# Patient Record
Sex: MALE | Race: WHITE | NOT HISPANIC OR LATINO | URBAN - METROPOLITAN AREA
[De-identification: names, ages, dates, MRNs, and addresses within clinical notes are randomized per-mention and may not be internally consistent; named-entity substitution may affect disease eponyms.]

---

## 2020-12-14 ENCOUNTER — EMERGENCY (EMERGENCY)
Facility: HOSPITAL | Age: 70
LOS: 1 days | Discharge: HOME | End: 2020-12-14
Attending: EMERGENCY MEDICINE | Admitting: EMERGENCY MEDICINE
Payer: MEDICARE

## 2020-12-14 VITALS
SYSTOLIC BLOOD PRESSURE: 143 MMHG | RESPIRATION RATE: 12 BRPM | HEART RATE: 62 BPM | DIASTOLIC BLOOD PRESSURE: 68 MMHG | OXYGEN SATURATION: 100 %

## 2020-12-14 VITALS
DIASTOLIC BLOOD PRESSURE: 73 MMHG | OXYGEN SATURATION: 99 % | HEIGHT: 69 IN | HEART RATE: 75 BPM | RESPIRATION RATE: 18 BRPM | WEIGHT: 199.96 LBS | SYSTOLIC BLOOD PRESSURE: 143 MMHG | TEMPERATURE: 98 F

## 2020-12-14 LAB
ALBUMIN SERPL ELPH-MCNC: 3.3 G/DL — SIGNIFICANT CHANGE UP (ref 3.3–5)
ALP SERPL-CCNC: 82 U/L — SIGNIFICANT CHANGE UP (ref 40–120)
ALT FLD-CCNC: 11 U/L — SIGNIFICANT CHANGE UP (ref 10–45)
ANION GAP SERPL CALC-SCNC: 6 MMOL/L — SIGNIFICANT CHANGE UP (ref 5–17)
AST SERPL-CCNC: 21 U/L — SIGNIFICANT CHANGE UP (ref 10–40)
BASOPHILS # BLD AUTO: 0 K/UL — SIGNIFICANT CHANGE UP (ref 0–0.2)
BASOPHILS NFR BLD AUTO: 0 % — SIGNIFICANT CHANGE UP (ref 0–2)
BILIRUB SERPL-MCNC: 0.2 MG/DL — SIGNIFICANT CHANGE UP (ref 0.2–1.2)
BUN SERPL-MCNC: 24 MG/DL — HIGH (ref 7–23)
CALCIUM SERPL-MCNC: 8.6 MG/DL — SIGNIFICANT CHANGE UP (ref 8.4–10.5)
CHLORIDE SERPL-SCNC: 101 MMOL/L — SIGNIFICANT CHANGE UP (ref 96–108)
CO2 SERPL-SCNC: 32 MMOL/L — HIGH (ref 22–31)
CREAT SERPL-MCNC: 0.98 MG/DL — SIGNIFICANT CHANGE UP (ref 0.5–1.3)
EOSINOPHIL # BLD AUTO: 0.21 K/UL — SIGNIFICANT CHANGE UP (ref 0–0.5)
EOSINOPHIL NFR BLD AUTO: 4 % — SIGNIFICANT CHANGE UP (ref 0–6)
GLUCOSE SERPL-MCNC: 86 MG/DL — SIGNIFICANT CHANGE UP (ref 70–99)
HCT VFR BLD CALC: 38.9 % — LOW (ref 39–50)
HGB BLD-MCNC: 12.9 G/DL — LOW (ref 13–17)
LYMPHOCYTES # BLD AUTO: 1.91 K/UL — SIGNIFICANT CHANGE UP (ref 1–3.3)
LYMPHOCYTES # BLD AUTO: 37 % — SIGNIFICANT CHANGE UP (ref 13–44)
MAGNESIUM SERPL-MCNC: 2.3 MG/DL — SIGNIFICANT CHANGE UP (ref 1.6–2.6)
MANUAL SMEAR VERIFICATION: SIGNIFICANT CHANGE UP
MCHC RBC-ENTMCNC: 29.4 PG — SIGNIFICANT CHANGE UP (ref 27–34)
MCHC RBC-ENTMCNC: 33.2 GM/DL — SIGNIFICANT CHANGE UP (ref 32–36)
MCV RBC AUTO: 88.6 FL — SIGNIFICANT CHANGE UP (ref 80–100)
MONOCYTES # BLD AUTO: 0.93 K/UL — HIGH (ref 0–0.9)
MONOCYTES NFR BLD AUTO: 18 % — HIGH (ref 2–14)
NEUTROPHILS # BLD AUTO: 2.11 K/UL — SIGNIFICANT CHANGE UP (ref 1.8–7.4)
NEUTROPHILS NFR BLD AUTO: 41 % — LOW (ref 43–77)
NRBC # BLD: 0 /100 — SIGNIFICANT CHANGE UP (ref 0–0)
NT-PROBNP SERPL-SCNC: 66 PG/ML — SIGNIFICANT CHANGE UP (ref 0–300)
PLAT MORPH BLD: NORMAL — SIGNIFICANT CHANGE UP
PLATELET # BLD AUTO: 198 K/UL — SIGNIFICANT CHANGE UP (ref 150–400)
POTASSIUM SERPL-MCNC: 4.4 MMOL/L — SIGNIFICANT CHANGE UP (ref 3.5–5.3)
POTASSIUM SERPL-SCNC: 4.4 MMOL/L — SIGNIFICANT CHANGE UP (ref 3.5–5.3)
PROT SERPL-MCNC: 7.3 G/DL — SIGNIFICANT CHANGE UP (ref 6–8.3)
RBC # BLD: 4.39 M/UL — SIGNIFICANT CHANGE UP (ref 4.2–5.8)
RBC # FLD: 13.8 % — SIGNIFICANT CHANGE UP (ref 10.3–14.5)
RBC BLD AUTO: NORMAL — SIGNIFICANT CHANGE UP
SODIUM SERPL-SCNC: 139 MMOL/L — SIGNIFICANT CHANGE UP (ref 135–145)
TROPONIN I SERPL-MCNC: <.017 NG/ML — LOW (ref 0.02–0.06)
WBC # BLD: 5.15 K/UL — SIGNIFICANT CHANGE UP (ref 3.8–10.5)
WBC # FLD AUTO: 5.15 K/UL — SIGNIFICANT CHANGE UP (ref 3.8–10.5)

## 2020-12-14 PROCEDURE — 83735 ASSAY OF MAGNESIUM: CPT

## 2020-12-14 PROCEDURE — 99285 EMERGENCY DEPT VISIT HI MDM: CPT

## 2020-12-14 PROCEDURE — 71250 CT THORAX DX C-: CPT

## 2020-12-14 PROCEDURE — 85025 COMPLETE CBC W/AUTO DIFF WBC: CPT

## 2020-12-14 PROCEDURE — 84484 ASSAY OF TROPONIN QUANT: CPT

## 2020-12-14 PROCEDURE — 80053 COMPREHEN METABOLIC PANEL: CPT

## 2020-12-14 PROCEDURE — 96374 THER/PROPH/DIAG INJ IV PUSH: CPT

## 2020-12-14 PROCEDURE — 71250 CT THORAX DX C-: CPT | Mod: 26

## 2020-12-14 PROCEDURE — 83880 ASSAY OF NATRIURETIC PEPTIDE: CPT

## 2020-12-14 PROCEDURE — 93005 ELECTROCARDIOGRAM TRACING: CPT

## 2020-12-14 PROCEDURE — 36415 COLL VENOUS BLD VENIPUNCTURE: CPT

## 2020-12-14 PROCEDURE — 93010 ELECTROCARDIOGRAM REPORT: CPT

## 2020-12-14 PROCEDURE — 99284 EMERGENCY DEPT VISIT MOD MDM: CPT | Mod: 25

## 2020-12-14 RX ADMIN — Medication 100 MILLIGRAM(S): at 13:45

## 2020-12-14 NOTE — ED PROVIDER NOTE - PATIENT PORTAL LINK FT
You can access the FollowMyHealth Patient Portal offered by [facility] by registering at the following website: [email or web address]. By joining [employer]’s FollowMyHealth portal, you will also be able to view your health information using other applications (apps) compatible with our system.

## 2020-12-14 NOTE — ED PROVIDER NOTE - CLINICAL SUMMARY MEDICAL DECISION MAKING FREE TEXT BOX
pt 71 yo m hx TBI, seizure disorder sent from [facility] for left sided chest pain and tenderness since he fell 3 weeks ago. also had an abrasion on his left knee but states that is not hurting him anymore  cellulitis of left knee. rib fx w/o lung pathology. will dc back to facility with IV abx

## 2020-12-14 NOTE — ED PROVIDER NOTE - ATTENDING CONTRIBUTION TO CARE
Corina with PALOMA Condon. pt 69 yo m hx TBI, seizure disorder sent from [facility] for left sided chest pain and tenderness since he fell 3 weeks ago. also had an abrasion on his left knee but states that is not hurting him anymore  cellulitis of left knee. rib fx w/o lung pathology. will dc back to facility with IV abx  I performed a face to face bedside interview with patient regarding history of present illness, review of symptoms and past medical history. I completed an independent physical exam.  I have discussed the patient's plan of care with Physician Assistant (PA). I agree with note as stated above, having amended the EMR as needed to reflect my findings.   This includes History of Present Illness, HIV, Past Medical/Surgical/Family/Social History, Allergies and Home Medications, Review of Systems, Physical Exam, and any Progress Notes during the time I functioned as the attending physician for this patient.

## 2020-12-14 NOTE — ED PROVIDER NOTE - WET READ LAUNCH FT
*  [facility]  Daily Progress Note    Admit Date:  8/21/2020      CC: ARF, CAD, HTN, CHOL  Subj: Today, -6.4L.  Continues to improve    Obj:   /67   Pulse 102   Temp 97.2 °F (36.2 °C) (Temporal)   Resp 18   Ht 5' 2\" (1.575 m)   Wt 125 lb (56.7 kg)   LMP 11/15/2017   SpO2 96%   BMI 22.86 kg/m²       Intake/Output Summary (Last 24 hours) at 8/28/2020 1134  Last data filed at 8/28/2020 0444  Gross per 24 hour   Intake 1502 ml   Output 1875 ml   Net -373 ml     Gen Alert, sob improved Heart  RRR, no MRG, nl apical impulse   Head NC, AT, no abnorm Abd  Soft, NT, +BS, no mass, no OM   Eyes PERRLA, conj/corn clear Ext  Amp   Nose Nares nl, no drain, NT Pulse Decr/absent amp   Throat Lips, mucosa, tongue nl Skin Color/text/turg nl, no rash/lesions   Neck S/S, TM, NT, no bruit/JVD Psych Nl mood and affect   Lung decr Lymph   No cervical or axillary LA   Ch wall NT, no deform Neuro  Nl gross M/S exam     CVMeds:  lipitor 40, coreg 3.125bid, brilinta 90bid, as 81, lasix 40ivbid    Lab Data: Relevant and available CV data reviewed    ASSESSMENT AND PLAN     *Acute respiratory failure  Status Acute on chronic combined s/d failure in setting of sepsis  Plan Improvement with diuresis and abx  *CAD  Hx PCI LAD, LAD stent thrombosis  TTE 40%  Plan Continue dapt with hx of stent thrombosis, bb, statin  *HTN  Status stable  Plan Continue current meds  *CHOL  Status On high dose statin  Plan Continue statin  *Hyponatremia  Status Improved today  Plan Per medicine, likely diuretic induced   Agree with changing lasix to po There are no Wet Read(s) to document.

## 2020-12-14 NOTE — ED ADULT NURSE NOTE - OBJECTIVE STATEMENT
70 yr old male BIBA A&Ox3 presents with +lt rib pain s/p fall.  Pt reports that he had a fall at the [facility] where he lives.  Pt reports that he fell on a carpet a week ago.  +pain with inspiration. States pain increases with movement.  Denies any head trauma or LOC. No bruising, deformities or open wounds  noted to Lt rib area. No SOB or work of breathing noted. No acute resp distress noted. Skin warm, dry, and intact.  Safety maintained. Will continue to monitor. 70 yr old male BIBA A&Ox3 presents with +lt rib pain s/p fall.  Pt reports that he had a fall at the [facility] where he lives.  Pt reports that he fell on a carpet a week ago and initially had LT knee pain however now he has rib pain. +pain with inspiration. States pain increases with movement.  Denies any head trauma or LOC. No bruising, deformities or open wounds  noted to Lt rib area. + redness noted to Lt knee area. No SOB or work of breathing noted. No acute resp distress noted. Skin warm, dry, and intact.  Safety maintained. Will continue to monitor.

## 2020-12-14 NOTE — ED PROVIDER NOTE - NSFOLLOWUPINSTRUCTIONS_ED_ALL_ED_FT
Cellulitis    Cellulitis is a skin infection caused by bacteria. This condition occurs most often in the arms and lower legs but can occur anywhere over the body. Symptoms include redness, swelling, warm skin, tenderness, and chills/fever. If you were prescribed an antibiotic medicine, take it as told by your health care provider. Do not stop taking the antibiotic even if you start to feel better.    SEEK IMMEDIATE MEDICAL CARE IF YOU HAVE ANY OF THE FOLLOWING SYMPTOMS: worsening fever, red streaks coming from affected area, vomiting or diarrhea, or dizziness/lightheadedness.     Follow up with your primary care provider within 48-72hours.  Take antibiotics as prescribed  Any worsening redness, swelling, streaking (red lines), fever, chills return to ER       Rib Fracture(s)    A rib fracture is a break or crack in one of the bones of the ribs. The ribs are a group of long, curved bones that wrap around your chest and attach to your spine. A broken or cracked rib is often painful, but most do not cause other problems and heal on their own over time. Symptoms include pain when you breath or cough or pain when pressing over the area. Breathing exercises will help keep your lungs inflated and prevent lung collapse or infection.    SEEK IMMEDIATE MEDICAL CARE IF YOU HAVE ANY OF THE FOLLOWING SYMPTOMS: fever, shortness of breath, coughing up blood, abdominal pain, nausea or vomiting, pain not controlled with medications.

## 2020-12-14 NOTE — ED PROVIDER NOTE - OBJECTIVE STATEMENT
pt 71 yo m hx TBI, seizure disorder sent from [facility] for left sided chest pain and tenderness since he fell 3 weeks ago. also had an abrasion on his left knee but states that is not hurting him anymore

## 2020-12-14 NOTE — ED ADULT TRIAGE NOTE - CHIEF COMPLAINT QUOTE
As per EMS fall 10-12 days ago, was evaluated and negative. Has complaints of chest pain on the left, worsening with movement.

## 2020-12-14 NOTE — ED PROVIDER NOTE - PHYSICAL EXAMINATION
General:     NAD, sleepy but arousable   Eyes: PERRL  Head:     old skull incisional scar with deformity of skull appearing chronic   Neck:     trachea midline  Lungs:     CTA b/l  Spine: no midline tenderness  Chest wall: cleft anterior chest wall tenderness. no ecchymosis, step offs or crepitus   CVS:     RRR  Abd:     +BS, s/nt/nd  Ext:   no deformities, moving all 4 extremities  Skin: erythema anterior knee with punctate vesicles on left leg.    Neuro: sleepy but arousable

## 2020-12-18 DIAGNOSIS — R07.89 OTHER CHEST PAIN: ICD-10-CM

## 2021-03-01 NOTE — ED ADULT NURSE NOTE - NSIMPLEMENTINTERV_GEN_ALL_ED
Statement Selected
Implemented All Fall Risk Interventions:  [place] to call system. Call bell, personal items and telephone within reach. Instruct patient to call for assistance. Room bathroom lighting operational. Non-slip footwear when patient is off stretcher. Physically safe environment: no spills, clutter or unnecessary equipment. Stretcher in lowest position, wheels locked, appropriate side rails in place. Provide visual cue, wrist band, yellow gown, etc. Monitor gait and stability. Monitor for mental status changes and reorient to person, place, and time. Review medications for side effects contributing to fall risk. Reinforce activity limits and safety measures with patient and family.

## 2021-05-27 ENCOUNTER — INPATIENT (INPATIENT)
Facility: HOSPITAL | Age: 71
LOS: 5 days | Discharge: NURSING, REHAB OR LONG-TERM CARE | DRG: 563 | End: 2021-06-02
Attending: HOSPITALIST | Admitting: INTERNAL MEDICINE
Payer: MEDICARE

## 2021-05-27 VITALS
SYSTOLIC BLOOD PRESSURE: 160 MMHG | TEMPERATURE: 100 F | RESPIRATION RATE: 16 BRPM | WEIGHT: 199.96 LBS | OXYGEN SATURATION: 100 % | HEIGHT: 69 IN | DIASTOLIC BLOOD PRESSURE: 74 MMHG | HEART RATE: 64 BPM

## 2021-05-27 DIAGNOSIS — S82.891A OTHER FRACTURE OF RIGHT LOWER LEG, INITIAL ENCOUNTER FOR CLOSED FRACTURE: ICD-10-CM

## 2021-05-27 DIAGNOSIS — Z98.890 OTHER SPECIFIED POSTPROCEDURAL STATES: Chronic | ICD-10-CM

## 2021-05-27 DIAGNOSIS — S82.841A DISPLACED BIMALLEOLAR FRACTURE OF RIGHT LOWER LEG, INITIAL ENCOUNTER FOR CLOSED FRACTURE: ICD-10-CM

## 2021-05-27 LAB
ALBUMIN SERPL ELPH-MCNC: 3.5 G/DL — SIGNIFICANT CHANGE UP (ref 3.3–5)
ALP SERPL-CCNC: 63 U/L — SIGNIFICANT CHANGE UP (ref 40–120)
ALT FLD-CCNC: 10 U/L — SIGNIFICANT CHANGE UP (ref 10–45)
ANION GAP SERPL CALC-SCNC: 3 MMOL/L — LOW (ref 5–17)
APTT BLD: 31.6 SEC — SIGNIFICANT CHANGE UP (ref 27.5–35.5)
AST SERPL-CCNC: 25 U/L — SIGNIFICANT CHANGE UP (ref 10–40)
BASOPHILS # BLD AUTO: 0.02 K/UL — SIGNIFICANT CHANGE UP (ref 0–0.2)
BASOPHILS NFR BLD AUTO: 0.3 % — SIGNIFICANT CHANGE UP (ref 0–2)
BILIRUB SERPL-MCNC: 0.6 MG/DL — SIGNIFICANT CHANGE UP (ref 0.2–1.2)
BLD GP AB SCN SERPL QL: SIGNIFICANT CHANGE UP
BUN SERPL-MCNC: 22 MG/DL — SIGNIFICANT CHANGE UP (ref 7–23)
CALCIUM SERPL-MCNC: 8.9 MG/DL — SIGNIFICANT CHANGE UP (ref 8.4–10.5)
CHLORIDE SERPL-SCNC: 90 MMOL/L — LOW (ref 96–108)
CO2 SERPL-SCNC: 38 MMOL/L — HIGH (ref 22–31)
CREAT SERPL-MCNC: 1.12 MG/DL — SIGNIFICANT CHANGE UP (ref 0.5–1.3)
EOSINOPHIL # BLD AUTO: 0.08 K/UL — SIGNIFICANT CHANGE UP (ref 0–0.5)
EOSINOPHIL NFR BLD AUTO: 1.2 % — SIGNIFICANT CHANGE UP (ref 0–6)
ETHANOL SERPL-MCNC: <3 MG/DL — SIGNIFICANT CHANGE UP (ref 0–3)
GLUCOSE SERPL-MCNC: 123 MG/DL — HIGH (ref 70–99)
HCT VFR BLD CALC: 35.9 % — LOW (ref 39–50)
HGB BLD-MCNC: 12 G/DL — LOW (ref 13–17)
IMM GRANULOCYTES NFR BLD AUTO: 0.6 % — SIGNIFICANT CHANGE UP (ref 0–1.5)
INR BLD: 0.97 RATIO — SIGNIFICANT CHANGE UP (ref 0.88–1.16)
LYMPHOCYTES # BLD AUTO: 1.1 K/UL — SIGNIFICANT CHANGE UP (ref 1–3.3)
LYMPHOCYTES # BLD AUTO: 16.7 % — SIGNIFICANT CHANGE UP (ref 13–44)
MCHC RBC-ENTMCNC: 28.4 PG — SIGNIFICANT CHANGE UP (ref 27–34)
MCHC RBC-ENTMCNC: 33.4 GM/DL — SIGNIFICANT CHANGE UP (ref 32–36)
MCV RBC AUTO: 85.1 FL — SIGNIFICANT CHANGE UP (ref 80–100)
MONOCYTES # BLD AUTO: 1.26 K/UL — HIGH (ref 0–0.9)
MONOCYTES NFR BLD AUTO: 19.1 % — HIGH (ref 2–14)
NEUTROPHILS # BLD AUTO: 4.09 K/UL — SIGNIFICANT CHANGE UP (ref 1.8–7.4)
NEUTROPHILS NFR BLD AUTO: 62.1 % — SIGNIFICANT CHANGE UP (ref 43–77)
NRBC # BLD: 0 /100 WBCS — SIGNIFICANT CHANGE UP (ref 0–0)
PLATELET # BLD AUTO: 202 K/UL — SIGNIFICANT CHANGE UP (ref 150–400)
POTASSIUM SERPL-MCNC: 3 MMOL/L — LOW (ref 3.5–5.3)
POTASSIUM SERPL-SCNC: 3 MMOL/L — LOW (ref 3.5–5.3)
PROT SERPL-MCNC: 7.6 G/DL — SIGNIFICANT CHANGE UP (ref 6–8.3)
PROTHROM AB SERPL-ACNC: 11.8 SEC — SIGNIFICANT CHANGE UP (ref 10.6–13.6)
RBC # BLD: 4.22 M/UL — SIGNIFICANT CHANGE UP (ref 4.2–5.8)
RBC # FLD: 13.2 % — SIGNIFICANT CHANGE UP (ref 10.3–14.5)
SODIUM SERPL-SCNC: 131 MMOL/L — LOW (ref 135–145)
TROPONIN I SERPL-MCNC: <.017 NG/ML — LOW (ref 0.02–0.06)
WBC # BLD: 6.59 K/UL — SIGNIFICANT CHANGE UP (ref 3.8–10.5)
WBC # FLD AUTO: 6.59 K/UL — SIGNIFICANT CHANGE UP (ref 3.8–10.5)

## 2021-05-27 PROCEDURE — 93010 ELECTROCARDIOGRAM REPORT: CPT

## 2021-05-27 PROCEDURE — 73700 CT LOWER EXTREMITY W/O DYE: CPT | Mod: 26,RT,MA

## 2021-05-27 PROCEDURE — 29515 APPLICATION SHORT LEG SPLINT: CPT

## 2021-05-27 PROCEDURE — 76376 3D RENDER W/INTRP POSTPROCES: CPT | Mod: 26

## 2021-05-27 PROCEDURE — 99285 EMERGENCY DEPT VISIT HI MDM: CPT | Mod: 25

## 2021-05-27 PROCEDURE — 73610 X-RAY EXAM OF ANKLE: CPT | Mod: 26,RT

## 2021-05-27 PROCEDURE — 99221 1ST HOSP IP/OBS SF/LOW 40: CPT

## 2021-05-27 PROCEDURE — 99223 1ST HOSP IP/OBS HIGH 75: CPT

## 2021-05-27 PROCEDURE — 70450 CT HEAD/BRAIN W/O DYE: CPT | Mod: 26,MA

## 2021-05-27 RX ORDER — TRAMADOL HYDROCHLORIDE 50 MG/1
50 TABLET ORAL EVERY 6 HOURS
Refills: 0 | Status: DISCONTINUED | OUTPATIENT
Start: 2021-05-27 | End: 2021-06-02

## 2021-05-27 RX ORDER — LEVOTHYROXINE SODIUM 125 MCG
25 TABLET ORAL DAILY
Refills: 0 | Status: DISCONTINUED | OUTPATIENT
Start: 2021-05-27 | End: 2021-06-02

## 2021-05-27 RX ORDER — ACETAMINOPHEN 500 MG
650 TABLET ORAL EVERY 8 HOURS
Refills: 0 | Status: DISCONTINUED | OUTPATIENT
Start: 2021-05-27 | End: 2021-06-02

## 2021-05-27 RX ORDER — BACLOFEN 100 %
10 POWDER (GRAM) MISCELLANEOUS AT BEDTIME
Refills: 0 | Status: DISCONTINUED | OUTPATIENT
Start: 2021-05-27 | End: 2021-06-02

## 2021-05-27 RX ORDER — SENNA PLUS 8.6 MG/1
2 TABLET ORAL AT BEDTIME
Refills: 0 | Status: DISCONTINUED | OUTPATIENT
Start: 2021-05-27 | End: 2021-06-02

## 2021-05-27 RX ORDER — ESCITALOPRAM OXALATE 10 MG/1
20 TABLET, FILM COATED ORAL DAILY
Refills: 0 | Status: DISCONTINUED | OUTPATIENT
Start: 2021-05-27 | End: 2021-06-02

## 2021-05-27 RX ORDER — MEMANTINE HYDROCHLORIDE 10 MG/1
10 TABLET ORAL AT BEDTIME
Refills: 0 | Status: DISCONTINUED | OUTPATIENT
Start: 2021-05-27 | End: 2021-06-02

## 2021-05-27 RX ORDER — THIAMINE MONONITRATE (VIT B1) 100 MG
100 TABLET ORAL DAILY
Refills: 0 | Status: DISCONTINUED | OUTPATIENT
Start: 2021-05-27 | End: 2021-06-02

## 2021-05-27 RX ORDER — MIRTAZAPINE 45 MG/1
7.5 TABLET, ORALLY DISINTEGRATING ORAL AT BEDTIME
Refills: 0 | Status: DISCONTINUED | OUTPATIENT
Start: 2021-05-27 | End: 2021-06-02

## 2021-05-27 RX ORDER — DIVALPROEX SODIUM 500 MG/1
500 TABLET, DELAYED RELEASE ORAL THREE TIMES A DAY
Refills: 0 | Status: DISCONTINUED | OUTPATIENT
Start: 2021-05-27 | End: 2021-05-28

## 2021-05-27 RX ORDER — ACETAMINOPHEN 500 MG
650 TABLET ORAL EVERY 12 HOURS
Refills: 0 | Status: DISCONTINUED | OUTPATIENT
Start: 2021-05-27 | End: 2021-06-02

## 2021-05-27 RX ORDER — OXYCODONE HYDROCHLORIDE 5 MG/1
5 TABLET ORAL EVERY 6 HOURS
Refills: 0 | Status: DISCONTINUED | OUTPATIENT
Start: 2021-05-27 | End: 2021-06-02

## 2021-05-27 RX ORDER — POTASSIUM CHLORIDE 20 MEQ
40 PACKET (EA) ORAL ONCE
Refills: 0 | Status: COMPLETED | OUTPATIENT
Start: 2021-05-27 | End: 2021-05-27

## 2021-05-27 RX ORDER — LIDOCAINE 4 G/100G
1 CREAM TOPICAL DAILY
Refills: 0 | Status: DISCONTINUED | OUTPATIENT
Start: 2021-05-27 | End: 2021-06-02

## 2021-05-27 RX ORDER — ALPRAZOLAM 0.25 MG
0.25 TABLET ORAL
Refills: 0 | Status: DISCONTINUED | OUTPATIENT
Start: 2021-05-27 | End: 2021-05-28

## 2021-05-27 RX ORDER — CARBIDOPA AND LEVODOPA 25; 100 MG/1; MG/1
1 TABLET ORAL
Refills: 0 | Status: DISCONTINUED | OUTPATIENT
Start: 2021-05-27 | End: 2021-06-02

## 2021-05-27 RX ORDER — CHOLECALCIFEROL (VITAMIN D3) 125 MCG
1000 CAPSULE ORAL DAILY
Refills: 0 | Status: DISCONTINUED | OUTPATIENT
Start: 2021-05-27 | End: 2021-06-02

## 2021-05-27 RX ORDER — FLUTICASONE PROPIONATE 50 MCG
1 SPRAY, SUSPENSION NASAL
Refills: 0 | Status: DISCONTINUED | OUTPATIENT
Start: 2021-05-27 | End: 2021-06-02

## 2021-05-27 RX ORDER — LACOSAMIDE 50 MG/1
200 TABLET ORAL
Refills: 0 | Status: DISCONTINUED | OUTPATIENT
Start: 2021-05-27 | End: 2021-06-02

## 2021-05-27 RX ORDER — ENOXAPARIN SODIUM 100 MG/ML
40 INJECTION SUBCUTANEOUS DAILY
Refills: 0 | Status: DISCONTINUED | OUTPATIENT
Start: 2021-05-27 | End: 2021-06-02

## 2021-05-27 RX ORDER — FOLIC ACID 0.8 MG
1 TABLET ORAL DAILY
Refills: 0 | Status: DISCONTINUED | OUTPATIENT
Start: 2021-05-27 | End: 2021-06-02

## 2021-05-27 RX ORDER — PANTOPRAZOLE SODIUM 20 MG/1
40 TABLET, DELAYED RELEASE ORAL
Refills: 0 | Status: DISCONTINUED | OUTPATIENT
Start: 2021-05-27 | End: 2021-06-02

## 2021-05-27 RX ORDER — QUETIAPINE FUMARATE 200 MG/1
25 TABLET, FILM COATED ORAL AT BEDTIME
Refills: 0 | Status: DISCONTINUED | OUTPATIENT
Start: 2021-05-27 | End: 2021-06-02

## 2021-05-27 RX ORDER — LACOSAMIDE 50 MG/1
300 TABLET ORAL
Refills: 0 | Status: DISCONTINUED | OUTPATIENT
Start: 2021-05-27 | End: 2021-06-02

## 2021-05-27 RX ORDER — FUROSEMIDE 40 MG
40 TABLET ORAL DAILY
Refills: 0 | Status: DISCONTINUED | OUTPATIENT
Start: 2021-05-27 | End: 2021-06-02

## 2021-05-27 RX ORDER — SODIUM CHLORIDE 9 MG/ML
1000 INJECTION INTRAMUSCULAR; INTRAVENOUS; SUBCUTANEOUS
Refills: 0 | Status: DISCONTINUED | OUTPATIENT
Start: 2021-05-27 | End: 2021-05-28

## 2021-05-27 RX ORDER — ACETAMINOPHEN 500 MG
650 TABLET ORAL ONCE
Refills: 0 | Status: COMPLETED | OUTPATIENT
Start: 2021-05-27 | End: 2021-05-27

## 2021-05-27 RX ORDER — ASPIRIN/CALCIUM CARB/MAGNESIUM 324 MG
81 TABLET ORAL DAILY
Refills: 0 | Status: DISCONTINUED | OUTPATIENT
Start: 2021-05-27 | End: 2021-06-02

## 2021-05-27 RX ORDER — RISPERIDONE 4 MG/1
0.25 TABLET ORAL
Refills: 0 | Status: DISCONTINUED | OUTPATIENT
Start: 2021-05-27 | End: 2021-06-02

## 2021-05-27 RX ORDER — PRIMIDONE 250 MG/1
50 TABLET ORAL
Refills: 0 | Status: DISCONTINUED | OUTPATIENT
Start: 2021-05-27 | End: 2021-05-28

## 2021-05-27 RX ORDER — PRIMIDONE 250 MG/1
50 TABLET ORAL DAILY
Refills: 0 | Status: DISCONTINUED | OUTPATIENT
Start: 2021-05-27 | End: 2021-06-02

## 2021-05-27 RX ORDER — GABAPENTIN 400 MG/1
300 CAPSULE ORAL AT BEDTIME
Refills: 0 | Status: DISCONTINUED | OUTPATIENT
Start: 2021-05-27 | End: 2021-06-02

## 2021-05-27 RX ORDER — POTASSIUM CHLORIDE 20 MEQ
20 PACKET (EA) ORAL ONCE
Refills: 0 | Status: COMPLETED | OUTPATIENT
Start: 2021-05-27 | End: 2021-05-27

## 2021-05-27 RX ADMIN — CARBIDOPA AND LEVODOPA 1 TABLET(S): 25; 100 TABLET ORAL at 21:49

## 2021-05-27 RX ADMIN — Medication 0.25 MILLIGRAM(S): at 23:41

## 2021-05-27 RX ADMIN — SENNA PLUS 2 TABLET(S): 8.6 TABLET ORAL at 21:52

## 2021-05-27 RX ADMIN — MEMANTINE HYDROCHLORIDE 10 MILLIGRAM(S): 10 TABLET ORAL at 21:49

## 2021-05-27 RX ADMIN — MIRTAZAPINE 7.5 MILLIGRAM(S): 45 TABLET, ORALLY DISINTEGRATING ORAL at 21:50

## 2021-05-27 RX ADMIN — Medication 40 MILLIEQUIVALENT(S): at 16:16

## 2021-05-27 RX ADMIN — Medication 650 MILLIGRAM(S): at 14:25

## 2021-05-27 RX ADMIN — DIVALPROEX SODIUM 500 MILLIGRAM(S): 500 TABLET, DELAYED RELEASE ORAL at 21:50

## 2021-05-27 RX ADMIN — QUETIAPINE FUMARATE 25 MILLIGRAM(S): 200 TABLET, FILM COATED ORAL at 21:49

## 2021-05-27 RX ADMIN — Medication 20 MILLIEQUIVALENT(S): at 18:33

## 2021-05-27 RX ADMIN — GABAPENTIN 300 MILLIGRAM(S): 400 CAPSULE ORAL at 21:50

## 2021-05-27 RX ADMIN — Medication 650 MILLIGRAM(S): at 13:25

## 2021-05-27 RX ADMIN — SODIUM CHLORIDE 75 MILLILITER(S): 9 INJECTION INTRAMUSCULAR; INTRAVENOUS; SUBCUTANEOUS at 20:26

## 2021-05-27 RX ADMIN — Medication 10 MILLIGRAM(S): at 21:50

## 2021-05-27 NOTE — CONSULT NOTE ADULT - PROBLEM SELECTOR RECOMMENDATION 9
I discussed treatment options with the patient. As the fracture appears to be stable and non-displaced I have advised conservative management with splint followed by cast immobilization.  He needs to remain strictly NWB on the right leg.  We discussed that if the fracture displaces he will require surgery.  He will need f/u x-rays weekly for the first 4 weeks.  Ice, elevation  DVT prophylaxis.  F/u in office after discharge.

## 2021-05-27 NOTE — ED ADULT NURSE NOTE - CHIEF COMPLAINT QUOTE
BIBA S/P fall with R ankle injury.  As per pt. he also had unwitnessed fall down last night, denies head injury.  ISAR positive.

## 2021-05-27 NOTE — ED ADULT NURSE NOTE - OBJECTIVE STATEMENT
Pt presents to ED from the [facility] s/p fall. Pt states he felt bilateral leg weakness and fell, now c/o right ankle pain and swelling. Denies any head trauma.

## 2021-05-27 NOTE — H&P ADULT - HISTORY OF PRESENT ILLNESS
70 y/o M with PMH of HTN, CAD, TBI, seizure disorder, dementia, Parkinson's, psychosis was BIB EMS from the [facility] for right ankle pain and swelling s/p fall last night. Pt reports his legs felt weak, gave up on him and he fell. He denies head injury or LOC. Denies dizziness, CP, SOB, palpitations prior or after the all. Pt denies any fever, chills, cough, abd pain, n/v/d, dysuria.   Spoke to pt's power of , Scarlet, she is concerned that pt fell due to alcohol as she saw many travel sized bottles of alcohol in pt's room. Pt states that the last drink he had was 1 month ago. alcohol level <3 in ED.  In ED, afebrile, VS stable. CT right ankle showed right distal tibia and fibular fracture. s/p splint in ED.

## 2021-05-27 NOTE — ED PROVIDER NOTE - OBJECTIVE STATEMENT
70 y/o M  with PMH of TBI, seizure, HTN was BIB EMS from the [facility] for right ankle pain and swelling s/p fall yesterday. Pt reports his legs felt weak and he fell. He denies head injury, CP, SOB, dizziness, symptoms prior to fall or other injuries 70 y/o M  with PMH of TBI, seizure, HTN, Parkinson's was BIB EMS from the [facility] for right ankle pain and swelling s/p fall yesterday. Pt reports his legs felt weak and he fell. He denies head injury, CP, SOB, dizziness, symptoms prior to fall or other injuries

## 2021-05-27 NOTE — H&P ADULT - NSICDXFAMILYHX_GEN_ALL_CORE_FT
FAMILY HISTORY:  Father  Still living? Unknown  Family history of rectal cancer, Age at diagnosis: Age Unknown

## 2021-05-27 NOTE — ED PROVIDER NOTE - PHYSICAL EXAMINATION
msk: + right ankle swelling with TTP over the lateral ankle. 2+pedal pulses. no bruising.   no neurovasc compromise   pelvis stable   FROM of hips and shoulders b/l

## 2021-05-27 NOTE — H&P ADULT - NSHPPHYSICALEXAM_GEN_ALL_CORE
T(C): 36.5 (05-27-21 @ 18:00), Max: 37.5 (05-27-21 @ 12:44)  HR: 60 (05-27-21 @ 18:00) (60 - 64)  BP: 134/81 (05-27-21 @ 18:00) (134/81 - 160/74)  RR: 16 (05-27-21 @ 18:00) (16 - 16)  SpO2: 100% (05-27-21 @ 18:00) (100% - 100%)  Wt(kg): --Vital Signs Last 24 Hrs  T(C): 36.5 (27 May 2021 18:00), Max: 37.5 (27 May 2021 12:44)  T(F): 97.7 (27 May 2021 18:00), Max: 99.5 (27 May 2021 12:44)  HR: 60 (27 May 2021 18:00) (60 - 64)  BP: 134/81 (27 May 2021 18:00) (134/81 - 160/74)  BP(mean): --  RR: 16 (27 May 2021 18:00) (16 - 16)  SpO2: 100% (27 May 2021 18:00) (100% - 100%)    PHYSICAL EXAM:  GENERAL: NAD, answering questions appropriately  HEAD:  Atraumatic, Normocephalic  EYES: EOMI, PERRLA, conjunctiva and sclera clear  ENMT: No tonsillar erythema, exudates, or enlargement; Moist mucous membranes, Good dentition, No lesions  NECK: Supple, No JVD  NERVOUS SYSTEM: awake, alert Good concentration; moving all extremities except RLE due to pain  CHEST/LUNG: Clear to percussion bilaterally; No rales, rhonchi, wheezing, or rubs  HEART: Regular rate and rhythm; No murmurs, rubs, or gallops  ABDOMEN: Soft, Nontender, Nondistended; Bowel sounds present  EXTREMITIES:  No clubbing, cyanosis, or edema. RLE in splint

## 2021-05-27 NOTE — H&P ADULT - NSICDXPASTMEDICALHX_GEN_ALL_CORE_FT
PAST MEDICAL HISTORY:  Dementia     CARMEN (generalized anxiety disorder)     HTN (hypertension)     Parkinsons disease     Seizure     TBI (traumatic brain injury)

## 2021-05-27 NOTE — ED ADULT NURSE REASSESSMENT NOTE - NS ED NURSE REASSESS COMMENT FT1
Corinne (power of ) called to let the team know that the patient has been drinking alcohol lately. She is not sure how much but when speaking to the patient yesterday, he seemed to be intoxicated. PALOMA Lan made aware.

## 2021-05-27 NOTE — ED ADULT TRIAGE NOTE - CHIEF COMPLAINT QUOTE
BIBA S/P fall with R ankle injury.  As per pt. he also had unwitnessed fall down last night, denies head injury. BIBA S/P fall with R ankle injury.  As per pt. he also had unwitnessed fall down last night, denies head injury.  ISAR positive.

## 2021-05-27 NOTE — ED PROVIDER NOTE - ATTENDING CONTRIBUTION TO CARE
72 y/o M  with PMH of TBI, seizure, HTN, Parkinson's was BIB EMS from the [facility] for right ankle pain and swelling s/p fall yesterday. Pt reports his legs felt weak and he fell. He denies head injury, CP, SOB, dizziness, symptoms prior to fall or other injuries. PE as noted above. Labs reviewed, k-3.0 will replenish. ankle XR images reviewed-- ?fx of lateral malleolus, will get an ankle CT.    337pm- KRISTI Reyes spoke with patients power of  Corinne, she states pt has been drinking ETOH at the [facility], he was intoxicated last night which caused his fall. will add on ETOH level and check head CT    5pm- ankle CT results reviewed: Intra-articular coronally oriented fracture of the distal tibia and fibula. Ortho paged, awaiting call back. posterior splint placed, will admit    512pm: Spoke with Dr. Soares, he agrees with admission plan. He also reccds adding stirup splint.  Dr. Palma:  I have reviewed and discussed with the PA/ resident the case specifics, including the history, physical assessment, evaluation, conclusion, laboratory results, and medical plan. I agree with the contents, and conclusions. I have personally examined, and interviewed the patient.

## 2021-05-27 NOTE — CONSULT NOTE ADULT - SUBJECTIVE AND OBJECTIVE BOX
YAHIR QUINTANA      71y Male with PMH of HTN, CAD, TBI, seizure disorder, dementia, Parkinson's, psychosis was BIB EMS from the [facility] for right ankle pain and swelling s/p fall last night. Pt reports his legs felt weak, gave up on him and he fell. He denies head injury or LOC. Denies dizziness, CP, SOB, palpitations prior or after the all.                                                                                                                                                         PAST MEDICAL HISTORY:  Dementia     CARMEN (generalized anxiety disorder)     HTN (hypertension)     Parkinsons disease     Seizure     TBI (traumatic brain injury).     PAST SURGICAL HISTORY:  Status post craniectomy.       T(F): 97.9  HR: 57  BP: 162/89  RR: 17  SpO2: 99%                        12.0   6.59  )-----------( 202      ( 27 May 2021 13:20 )             35.9                     05-27    131<L>  |  90<L>  |  22  ----------------------------<  123<H>  3.0<L>   |  38<H>  |  1.12    Ca    8.9      27 May 2021 13:20    TPro  7.6  /  Alb  3.5  /  TBili  0.6  /  DBili  x   /  AST  25  /  ALT  10  /  AlkPhos  63  05-27      Physical Exam :    -   Right leg with splint in place, C/D/I, diffuse swelling and tenderness about the ankle and foot.   -   Distal Neurvascular status intact grossly.   -   Warm well perfused; capillary refill <3 seconds   -   (+)EHL/FHL 5/5  -   (+) Sensation to light touch      EXAM:  XR ANKLE COMP MIN 3 VIEWS RT      PROCEDURE DATE:  05/27/2021        INTERPRETATION:  Right ankle. Patient fell with local trauma. 3 views.    There is fairly significant ankle edema.    There is mild degeneration of the malleolar tips. There are small posterior and inferior calcaneal spurs. No bone destruction or fracture.    It should be noted the CAT scan which followed showed coronally oriented fractures of the distal tibia and fibula.    IMPRESSION: Fractures seen on CAT scan. Extensive edema.              MARIAM WERNER M.D., ATTENDING RADIOLOGIST  This document has been electronically signed. May 27 2021  4:59PM        EXAM:  CT ANKLE ONLY RT    EXAM:  CT 3D RECONSTRUCT WO ZACARIASYOSVANYTRINITY      PROCEDURE DATE:  05/27/2021        INTERPRETATION:  CT ANKLE RIGHT, CT 3D RECONSTRUCTION WO WORKSTATION dated 5/27/2021 3:57 PM    INDICATION: Fall with ankle pain.    COMPARISON: Ankle radiographs dated 5/27/2021    TECHNIQUE: CT imaging of the right ankle through forefoot was performed. The data was reformatted in the axial, coronal, and sagittal planes. Additionally, 3-D reformatted imaging was created.    FINDINGS:    OSSEOUS STRUCTURES: There is a coronally oriented fracture line traversing the distal tibia and fibula in similar fashion with intra-articular extension. There is a small fracture gap at the articular surface measuring up to 0.3 cm at both the tibia and fibula. No additional fracture is appreciated.  SYNOVIUM/ JOINT FLUID: No joint effusion  TENDONS: Tendons are intact. No full-thickness tendon tear or retraction  MUSCLES: Preserved musculature.  NEUROVASCULAR STRUCTURES: Severe vascular calcifications.  SUBCUTANEOUS SOFT TISSUES: Large circumferential ankle soft tissue swelling.    3-D reformatted imaging confirms these findings.    IMPRESSION:    Intra-articular coronally oriented fracture of the distal tibia and fibula              SURJIT HYMAN MD; Attending Radiologist  This document has been electronically signed. May 27 2021  4:27PM

## 2021-05-27 NOTE — ED ADULT NURSE NOTE - PMH
Dementia    CARMEN (generalized anxiety disorder)    HTN (hypertension)    Parkinsons disease    Seizure    TBI (traumatic brain injury)

## 2021-05-27 NOTE — ED PROVIDER NOTE - CLINICAL SUMMARY MEDICAL DECISION MAKING FREE TEXT BOX
70 y/o M  with PMH of TBI, seizure, HTN, Parkinson's was BIB EMS from the [facility] for right ankle pain and swelling s/p fall yesterday. Pt reports his legs felt weak and he fell. He denies head injury, CP, SOB, dizziness, symptoms prior to fall or other injuries. PE as noted above. Labs reviewed, k-3.0 will replenish. ankle XR images reviewed-- ?fx of lateral malleolus, will get an ankle CT.    337pm- KRISTI Reyes spoke with patients power of  Corinne, she states pt has been drinking ETOH at the [facility], he was intoxicated last night which caused his fall. will add on ETOH level and check head CT 72 y/o M  with PMH of TBI, seizure, HTN, Parkinson's was BIB EMS from the [facility] for right ankle pain and swelling s/p fall yesterday. Pt reports his legs felt weak and he fell. He denies head injury, CP, SOB, dizziness, symptoms prior to fall or other injuries. PE as noted above. Labs reviewed, k-3.0 will replenish. ankle XR images reviewed-- ?fx of lateral malleolus, will get an ankle CT.    337pm- KRISTI Reyes spoke with patients power of  Corinne, she states pt has been drinking ETOH at the [facility], he was intoxicated last night which caused his fall. will add on ETOH level and check head CT    5pm- ankle CT results reviewed: Intra-articular coronally oriented fracture of the distal tibia and fibula. Ortho paged, awaiting call back. posterior splint placed, will admit 72 y/o M  with PMH of TBI, seizure, HTN, Parkinson's was BIB EMS from the [facility] for right ankle pain and swelling s/p fall yesterday. Pt reports his legs felt weak and he fell. He denies head injury, CP, SOB, dizziness, symptoms prior to fall or other injuries. PE as noted above. Labs reviewed, k-3.0 will replenish. ankle XR images reviewed-- ?fx of lateral malleolus, will get an ankle CT.    337pm- KRISTI Reyes spoke with patients power of  Corinne, she states pt has been drinking ETOH at the [facility], he was intoxicated last night which caused his fall. will add on ETOH level and check head CT    5pm- ankle CT results reviewed: Intra-articular coronally oriented fracture of the distal tibia and fibula. Ortho paged, awaiting call back. posterior splint placed, will admit    512pm: Spoke with Dr. Soares, he agrees with admission plan. He also reccds adding stirup splint.

## 2021-05-27 NOTE — H&P ADULT - ASSESSMENT
70 y/o M with PMH of HTN, CAD, CHF, TBI, seizure disorder, dementia, Parkinson's, psychosis was BIB EMS from the [facility] for right ankle pain and swelling s/p fall, found to have right distal tibia and fibular fracture.    # Acute right distal tibia and fibular fracture.  # r/o fall due to polypharmacy   - s/p splint in ED  - pain control  - pending orthopedic consult, cardiology consult for pre-op if surgery needed  - pt on multiple sedative medications, monitor pt closely, consider psych consult to adjust meds    # Alcohol use?  pt reports his last drink was one month ago  pt's power yoselin hollis is concerned that pt fell due to alcohol as she saw many travel sized bottles of alcohol in pt's room.  alcohol level<3   will order for symptom triggered CIWA protocol   folate, thiamine and multivitamin     # Hyponatremia   - encourage adequate po intake  - monitor    # Hypokalemia  - repleted. monitor    # CAD   # CHF  - on lasix 40mg BID and metolazone 2.5mg mon, wed, fri  - check ECHO, no past echo in record  - not on ASA or BB?    # psychosis/ anxiety  - cont risperdal, seroquel, mirtazepine and xanax    # chronic back pain  - tylenol prn,   - baclofen  - lidocaine     # history of TBI and seizure disorder  - vimpat  - primidone     # Parkinsons and dementia  - sinemet     # dvt ppx: lovenox subq    IMPROVE VTE Individual Risk Assessment    RISK                                                                Points    [  ] Previous VTE                                                  3    [  ] Thrombophilia                                               2    [  ] Lower limb paralysis                                      2        (unable to hold up >15 seconds)      [  ] Current Cancer                                              2         (within 6 months)    [  ] Immobilization > 24 hrs                                1    [  ] ICU/CCU stay > 24 hours                              1    [  ] Age > 60                                                      1    IMPROVE VTE Score _________    IMPROVE Score 0-1: Low Risk, No VTE prophylaxis required for most patients, encourage ambulation.   IMPROVE Score 2-3: At risk, pharmacologic VTE prophylaxis is indicated for most patients (in the absence of a contraindication)  IMPROVE Score > or = 4: High Risk, pharmacologic VTE prophylaxis is indicated for most patients (in the absence of a contraindication)     72 y/o M with PMH of HTN, CAD, CHF, TBI, seizure disorder, dementia, Parkinson's, psychosis was BIB EMS from the [facility] for right ankle pain and swelling s/p fall, found to have right distal tibia and fibular fracture.    # Acute right distal tibia and fibular fracture.  # r/o fall due to polypharmacy   - s/p splint in ED  - pain control  - pending orthopedic consult, cardiology consult for pre-op if surgery needed  - pt on multiple sedative medications, monitor pt closely, consider psych consult to adjust meds    # Alcohol use?  pt reports his last drink was one month ago  pt's power of bunny is concerned that pt fell due to alcohol as she saw many travel sized bottles of alcohol in pt's room.  alcohol level<3   will order for symptom triggered CIWA protocol   folate, thiamine and multivitamin     # Hyponatremia   - encourage adequate po intake  - monitor    # Hypokalemia  - repleted. monitor    # CAD   # CHF  - on lasix 40mg BID and metolazone 2.5mg mon, wed, fri  - check ECHO, no past echo in record  - not on ASA or BB?    # psychosis/ anxiety  - cont risperdal, seroquel, mirtazepine and xanax    # chronic back pain  - tylenol prn,   - baclofen  - lidocaine     # history of TBI and seizure disorder  - vimpat  - primidone     # Parkinsons and dementia  - sinemet     # dvt ppx: lovenox subq    Updated pt's power Scarlet campoverde, [phone] regarding plan of care and pt's status    IMPROVE VTE Individual Risk Assessment    RISK                                                                Points    [  ] Previous VTE                                                  3    [  ] Thrombophilia                                               2    [  ] Lower limb paralysis                                      2        (unable to hold up >15 seconds)      [  ] Current Cancer                                              2         (within 6 months)    [  ] Immobilization > 24 hrs                                1    [  ] ICU/CCU stay > 24 hours                              1    [  ] Age > 60                                                      1    IMPROVE VTE Score _________    IMPROVE Score 0-1: Low Risk, No VTE prophylaxis required for most patients, encourage ambulation.   IMPROVE Score 2-3: At risk, pharmacologic VTE prophylaxis is indicated for most patients (in the absence of a contraindication)  IMPROVE Score > or = 4: High Risk, pharmacologic VTE prophylaxis is indicated for most patients (in the absence of a contraindication)     72 y/o M with PMH of HTN, CAD, CHF, TBI, seizure disorder, dementia, Parkinson's, psychosis was BIB EMS from the [facility] for right ankle pain and swelling s/p fall, found to have right distal tibia and fibular fracture.    # Acute right distal tibia and fibular fracture.  # r/o fall due to polypharmacy   - s/p splint in ED  - pain control  - pending orthopedic consult, cardiology consult for pre-op if surgery needed  - pt on multiple sedative medications, monitor pt closely, consider psych consult to adjust meds    # Alcohol use?  pt reports his last drink was one month ago  pt's power of bunny is concerned that pt fell due to alcohol as she saw many travel sized bottles of alcohol in pt's room.  alcohol level<3   will order for symptom triggered CIWA protocol   folate, thiamine and multivitamin     # Hyponatremia   - IVF for 12 h  - encourage adequate po intake  - monitor    # Hypokalemia  - repleted. monitor    # CAD   # CHF  - on lasix 40mg BID and metolazone 2.5mg mon, wed, fri  - check ECHO, no past echo in record  - not on ASA or BB?    # psychosis/ anxiety  - cont risperdal, seroquel, mirtazepine and xanax    # chronic back pain  - tylenol prn,   - baclofen  - lidocaine     # history of TBI and seizure disorder  - vimpat  - primidone     # Parkinsons and dementia  - sinemet     # dvt ppx: lovenox subq    Updated pt's power of Scarlet hollis, [phone] regarding plan of care and pt's status    IMPROVE VTE Individual Risk Assessment    RISK                                                                Points    [  ] Previous VTE                                                  3    [  ] Thrombophilia                                               2    [  ] Lower limb paralysis                                      2        (unable to hold up >15 seconds)      [  ] Current Cancer                                              2         (within 6 months)    [  ] Immobilization > 24 hrs                                1    [  ] ICU/CCU stay > 24 hours                              1    [  ] Age > 60                                                      1    IMPROVE VTE Score _________    IMPROVE Score 0-1: Low Risk, No VTE prophylaxis required for most patients, encourage ambulation.   IMPROVE Score 2-3: At risk, pharmacologic VTE prophylaxis is indicated for most patients (in the absence of a contraindication)  IMPROVE Score > or = 4: High Risk, pharmacologic VTE prophylaxis is indicated for most patients (in the absence of a contraindication)     70 y/o M with PMH of HTN, CAD, CHF, TBI, seizure disorder, dementia, Parkinson's, psychosis was BIB EMS from the [facility] for right ankle pain and swelling s/p fall, found to have right distal tibia and fibular fracture.    # Acute right distal tibia and fibular fracture.  # r/o fall due to other causes: polypharmacy, dehydration  - s/p splint in ED  - pain control  - pending orthopedic consult, cardiology consult for pre-op if surgery needed  - pt on multiple sedative medications, monitor pt closely, consider psych consult to adjust meds if needed  - check valproic acid level    # Alcohol dependence/ abuse?  pt reports his last drink was one month ago  pt's power of bunny is concerned that pt fell due to alcohol as she saw many travel sized alcohol bottles in pt's room.  alcohol level<3 in ED  will order for symptom triggered CIWA protocol   folate, thiamine and multivitamin     # Hyponatremia   - IVF for 12 h  - encourage adequate po intake  - monitor    # Hypokalemia  - repleted. monitor    # CAD   # CHF  - on lasix 40mg BID and metolazone 2.5mg mon, wed, fri  - not on ASA or BB?  - add ASA  - check ECHO, no past echo in record    # psychosis/ anxiety  - cont risperdal, seroquel, mirtazepine and xanax    # chronic back pain  - tylenol prn,   - baclofen  - lidocaine     # history of TBI and seizure disorder  - vimpat  - primidone     # Parkinsons and dementia  - sinemet     # dvt ppx: lovenox subq    Updated pt's power of Scarlet hollis, [phone] regarding plan of care and pt's status    IMPROVE VTE Individual Risk Assessment    RISK                                                                Points    [  ] Previous VTE                                                  3    [  ] Thrombophilia                                               2    [  ] Lower limb paralysis                                      2        (unable to hold up >15 seconds)      [  ] Current Cancer                                              2         (within 6 months)    [  ] Immobilization > 24 hrs                                1    [  ] ICU/CCU stay > 24 hours                              1    [  ] Age > 60                                                      1    IMPROVE VTE Score _________    IMPROVE Score 0-1: Low Risk, No VTE prophylaxis required for most patients, encourage ambulation.   IMPROVE Score 2-3: At risk, pharmacologic VTE prophylaxis is indicated for most patients (in the absence of a contraindication)  IMPROVE Score > or = 4: High Risk, pharmacologic VTE prophylaxis is indicated for most patients (in the absence of a contraindication)     70 y/o M with PMH of HTN, CAD, CHF, TBI, seizure disorder, dementia, Parkinson's, psychosis was BIB EMS from the [facility] for right ankle pain and swelling s/p fall, found to have right distal tibia and fibular fracture.    # Acute right distal tibia and fibular fracture.  # r/o fall due to other causes: polypharmacy, dehydration  - s/p splint in ED  - pain control  - pending orthopedic consult, cardiology consult for pre-op if surgery needed  - pt on multiple sedative medications, monitor pt closely, consider psych consult to adjust meds if needed  - check valproic acid level  - decrease lasix to once a day for now    # Alcohol dependence/ abuse?  pt reports his last drink was one month ago  pt's power of bunny is concerned that pt fell due to alcohol as she saw many travel sized alcohol bottles in pt's room.  alcohol level<3 in ED  will order for symptom triggered CIWA protocol   folate, thiamine and multivitamin     # Hyponatremia   - IVF for 12 h  - encourage adequate po intake  - monitor    # Hypokalemia  - repleted. monitor    # CAD   # CHF  - on lasix 40mg BID and metolazone 2.5mg mon, wed, fri at home  - decrease lasix to 40mg daily  - not on ASA or BB?  - add ASA  - check ECHO, no past echo in record    # psychosis/ anxiety  - cont lexapro, risperdal, seroquel, mirtazepine and xanax    # chronic back pain  - tylenol prn,   - baclofen  - lidocaine     # history of TBI and seizure disorder  - vimpat  - depakote  - primidone     # Parkinsons and dementia  - sinemet     # dvt ppx: lovenox subq    Updated pt's power of Scarlet hollis, [phone] regarding plan of care and pt's status    IMPROVE VTE Individual Risk Assessment    RISK                                                                Points    [  ] Previous VTE                                                  3    [  ] Thrombophilia                                               2    [  ] Lower limb paralysis                                      2        (unable to hold up >15 seconds)      [  ] Current Cancer                                              2         (within 6 months)    [  ] Immobilization > 24 hrs                                1    [  ] ICU/CCU stay > 24 hours                              1    [  ] Age > 60                                                      1    IMPROVE VTE Score _________    IMPROVE Score 0-1: Low Risk, No VTE prophylaxis required for most patients, encourage ambulation.   IMPROVE Score 2-3: At risk, pharmacologic VTE prophylaxis is indicated for most patients (in the absence of a contraindication)  IMPROVE Score > or = 4: High Risk, pharmacologic VTE prophylaxis is indicated for most patients (in the absence of a contraindication)     70 y/o M with PMH of HTN, CAD, CHF, TBI, seizure disorder, dementia, Parkinson's, psychosis was BIB EMS from the [facility] for right ankle pain and swelling s/p fall, found to have right distal tibia and fibular fracture.    # Acute right distal tibia and fibular fracture.  # r/o fall due to other causes: polypharmacy, dehydration  - s/p splint in ED  - pain control  - pending orthopedic consult, cardiology consult for pre-op if surgery needed  - pt on multiple sedative medications, monitor pt closely, consider psych consult to adjust meds if needed  - check valproic acid level  - decrease lasix to once a day for now    # Alcohol dependence/ abuse?  pt reports his last drink was one month ago  pt's power of bunny is concerned that pt fell due to alcohol as she saw many travel sized alcohol bottles in pt's room.  alcohol level<3 in ED  will order for symptom triggered CIWA protocol   folate, thiamine and multivitamin     # Hyponatremia   - IVF for 12 h  - encourage adequate po intake  - monitor    # Hypokalemia  - repleted. monitor    # CAD   # CHF  - on lasix 40mg BID and metolazone 2.5mg mon, wed, fri at home  - serum bicarb 38; decrease lasix to 40mg daily  - not on ASA or BB?  - will add ASA  - check ECHO, no past echo in record    # psychosis/ anxiety  - cont lexapro, risperdal, seroquel, mirtazepine and xanax    # chronic back pain  - tylenol prn,   - baclofen  - lidocaine     # history of TBI and seizure disorder  - vimpat  - depakote  - primidone     # Parkinsons and dementia  - sinemet     # dvt ppx: lovenox subq    Updated pt's power of Scarlet hollis, [phone] regarding plan of care and pt's status    IMPROVE VTE Individual Risk Assessment    RISK                                                                Points    [  ] Previous VTE                                                  3    [  ] Thrombophilia                                               2    [  ] Lower limb paralysis                                      2        (unable to hold up >15 seconds)      [  ] Current Cancer                                              2         (within 6 months)    [  ] Immobilization > 24 hrs                                1    [  ] ICU/CCU stay > 24 hours                              1    [  ] Age > 60                                                      1    IMPROVE VTE Score _________    IMPROVE Score 0-1: Low Risk, No VTE prophylaxis required for most patients, encourage ambulation.   IMPROVE Score 2-3: At risk, pharmacologic VTE prophylaxis is indicated for most patients (in the absence of a contraindication)  IMPROVE Score > or = 4: High Risk, pharmacologic VTE prophylaxis is indicated for most patients (in the absence of a contraindication)

## 2021-05-27 NOTE — H&P ADULT - NSHPLABSRESULTS_GEN_ALL_CORE
LABS:                        12.0   6.59  )-----------( 202      ( 27 May 2021 13:20 )             35.9     05-27    131<L>  |  90<L>  |  22  ----------------------------<  123<H>  3.0<L>   |  38<H>  |  1.12    Ca    8.9      27 May 2021 13:20    TPro  7.6  /  Alb  3.5  /  TBili  0.6  /  DBili  x   /  AST  25  /  ALT  10  /  AlkPhos  63  05-27         CAPILLARY BLOOD GLUCOSE        RADIOLOGY & ADDITIONAL TESTS:    EKG reviewed by me. NSR    Consultant(s) Notes Reviewed:  [x ] YES  [ ] NO  Care Discussed with Consultants/Other Providers [ x] YES  [ ] NO Dr. aPlma  Imaging Personally Reviewed:  [ ] YES  [ ] NO

## 2021-05-27 NOTE — H&P ADULT - NSHPSOCIALHISTORY_GEN_ALL_CORE
ex smoker, quit 25 years ago  pt stated last drink one month ago, used to drink about 1 bottle of wine per week   denies illicit drug use

## 2021-05-28 LAB
ANION GAP SERPL CALC-SCNC: 5 MMOL/L — SIGNIFICANT CHANGE UP (ref 5–17)
BUN SERPL-MCNC: 18 MG/DL — SIGNIFICANT CHANGE UP (ref 7–23)
CALCIUM SERPL-MCNC: 8.3 MG/DL — LOW (ref 8.4–10.5)
CHLORIDE SERPL-SCNC: 91 MMOL/L — LOW (ref 96–108)
CO2 SERPL-SCNC: 35 MMOL/L — HIGH (ref 22–31)
COVID-19 SPIKE DOMAIN AB INTERP: POSITIVE
COVID-19 SPIKE DOMAIN ANTIBODY RESULT: >250 U/ML — HIGH
CREAT SERPL-MCNC: 0.86 MG/DL — SIGNIFICANT CHANGE UP (ref 0.5–1.3)
GLUCOSE SERPL-MCNC: 98 MG/DL — SIGNIFICANT CHANGE UP (ref 70–99)
HCV AB S/CO SERPL IA: 17.76 S/CO — HIGH (ref 0–0.99)
HCV AB SERPL-IMP: REACTIVE
MAGNESIUM SERPL-MCNC: 2.1 MG/DL — SIGNIFICANT CHANGE UP (ref 1.6–2.6)
PHOSPHATE SERPL-MCNC: 3 MG/DL — SIGNIFICANT CHANGE UP (ref 2.5–4.5)
POTASSIUM SERPL-MCNC: 3.2 MMOL/L — LOW (ref 3.5–5.3)
POTASSIUM SERPL-SCNC: 3.2 MMOL/L — LOW (ref 3.5–5.3)
SARS-COV-2 IGG+IGM SERPL QL IA: >250 U/ML — HIGH
SARS-COV-2 IGG+IGM SERPL QL IA: POSITIVE
SARS-COV-2 RNA SPEC QL NAA+PROBE: SIGNIFICANT CHANGE UP
SODIUM SERPL-SCNC: 131 MMOL/L — LOW (ref 135–145)
VALPROATE SERPL-MCNC: 74 UG/ML — SIGNIFICANT CHANGE UP (ref 50–100)

## 2021-05-28 PROCEDURE — 99233 SBSQ HOSP IP/OBS HIGH 50: CPT

## 2021-05-28 PROCEDURE — 93306 TTE W/DOPPLER COMPLETE: CPT | Mod: 26

## 2021-05-28 RX ORDER — SODIUM CHLORIDE 9 MG/ML
1000 INJECTION INTRAMUSCULAR; INTRAVENOUS; SUBCUTANEOUS
Refills: 0 | Status: DISCONTINUED | OUTPATIENT
Start: 2021-05-28 | End: 2021-05-30

## 2021-05-28 RX ORDER — DIVALPROEX SODIUM 500 MG/1
500 TABLET, DELAYED RELEASE ORAL THREE TIMES A DAY
Refills: 0 | Status: DISCONTINUED | OUTPATIENT
Start: 2021-05-28 | End: 2021-06-02

## 2021-05-28 RX ORDER — POTASSIUM CHLORIDE 20 MEQ
40 PACKET (EA) ORAL EVERY 4 HOURS
Refills: 0 | Status: COMPLETED | OUTPATIENT
Start: 2021-05-28 | End: 2021-05-28

## 2021-05-28 RX ORDER — ALPRAZOLAM 0.25 MG
0.25 TABLET ORAL THREE TIMES A DAY
Refills: 0 | Status: DISCONTINUED | OUTPATIENT
Start: 2021-05-28 | End: 2021-05-28

## 2021-05-28 RX ORDER — PRIMIDONE 250 MG/1
100 TABLET ORAL
Refills: 0 | Status: DISCONTINUED | OUTPATIENT
Start: 2021-05-28 | End: 2021-06-02

## 2021-05-28 RX ORDER — ALPRAZOLAM 0.25 MG
0.25 TABLET ORAL THREE TIMES A DAY
Refills: 0 | Status: DISCONTINUED | OUTPATIENT
Start: 2021-05-28 | End: 2021-06-01

## 2021-05-28 RX ADMIN — RISPERIDONE 0.25 MILLIGRAM(S): 4 TABLET ORAL at 05:09

## 2021-05-28 RX ADMIN — LACOSAMIDE 300 MILLIGRAM(S): 50 TABLET ORAL at 17:57

## 2021-05-28 RX ADMIN — PANTOPRAZOLE SODIUM 40 MILLIGRAM(S): 20 TABLET, DELAYED RELEASE ORAL at 05:11

## 2021-05-28 RX ADMIN — Medication 1 SPRAY(S): at 05:08

## 2021-05-28 RX ADMIN — Medication 100 MILLIGRAM(S): at 12:03

## 2021-05-28 RX ADMIN — Medication 40 MILLIEQUIVALENT(S): at 12:03

## 2021-05-28 RX ADMIN — SODIUM CHLORIDE 75 MILLILITER(S): 9 INJECTION INTRAMUSCULAR; INTRAVENOUS; SUBCUTANEOUS at 12:06

## 2021-05-28 RX ADMIN — GABAPENTIN 300 MILLIGRAM(S): 400 CAPSULE ORAL at 21:51

## 2021-05-28 RX ADMIN — Medication 650 MILLIGRAM(S): at 05:09

## 2021-05-28 RX ADMIN — Medication 650 MILLIGRAM(S): at 17:57

## 2021-05-28 RX ADMIN — Medication 0.25 MILLIGRAM(S): at 21:52

## 2021-05-28 RX ADMIN — LIDOCAINE 1 PATCH: 4 CREAM TOPICAL at 12:06

## 2021-05-28 RX ADMIN — Medication 25 MICROGRAM(S): at 05:08

## 2021-05-28 RX ADMIN — TRAMADOL HYDROCHLORIDE 50 MILLIGRAM(S): 50 TABLET ORAL at 21:55

## 2021-05-28 RX ADMIN — Medication 40 MILLIGRAM(S): at 05:10

## 2021-05-28 RX ADMIN — PRIMIDONE 100 MILLIGRAM(S): 250 TABLET ORAL at 17:57

## 2021-05-28 RX ADMIN — Medication 1 MILLIGRAM(S): at 12:03

## 2021-05-28 RX ADMIN — SENNA PLUS 2 TABLET(S): 8.6 TABLET ORAL at 21:48

## 2021-05-28 RX ADMIN — Medication 10 MILLIGRAM(S): at 21:45

## 2021-05-28 RX ADMIN — CARBIDOPA AND LEVODOPA 1 TABLET(S): 25; 100 TABLET ORAL at 05:09

## 2021-05-28 RX ADMIN — DIVALPROEX SODIUM 500 MILLIGRAM(S): 500 TABLET, DELAYED RELEASE ORAL at 14:05

## 2021-05-28 RX ADMIN — SODIUM CHLORIDE 75 MILLILITER(S): 9 INJECTION INTRAMUSCULAR; INTRAVENOUS; SUBCUTANEOUS at 20:10

## 2021-05-28 RX ADMIN — MEMANTINE HYDROCHLORIDE 10 MILLIGRAM(S): 10 TABLET ORAL at 21:45

## 2021-05-28 RX ADMIN — CARBIDOPA AND LEVODOPA 1 TABLET(S): 25; 100 TABLET ORAL at 17:57

## 2021-05-28 RX ADMIN — SODIUM CHLORIDE 75 MILLILITER(S): 9 INJECTION INTRAMUSCULAR; INTRAVENOUS; SUBCUTANEOUS at 09:52

## 2021-05-28 RX ADMIN — Medication 1 TABLET(S): at 12:04

## 2021-05-28 RX ADMIN — CARBIDOPA AND LEVODOPA 1 TABLET(S): 25; 100 TABLET ORAL at 12:04

## 2021-05-28 RX ADMIN — LACOSAMIDE 200 MILLIGRAM(S): 50 TABLET ORAL at 08:28

## 2021-05-28 RX ADMIN — Medication 40 MILLIEQUIVALENT(S): at 13:56

## 2021-05-28 RX ADMIN — MIRTAZAPINE 7.5 MILLIGRAM(S): 45 TABLET, ORALLY DISINTEGRATING ORAL at 21:45

## 2021-05-28 RX ADMIN — LIDOCAINE 1 PATCH: 4 CREAM TOPICAL at 18:37

## 2021-05-28 RX ADMIN — DIVALPROEX SODIUM 500 MILLIGRAM(S): 500 TABLET, DELAYED RELEASE ORAL at 21:48

## 2021-05-28 RX ADMIN — ENOXAPARIN SODIUM 40 MILLIGRAM(S): 100 INJECTION SUBCUTANEOUS at 12:03

## 2021-05-28 RX ADMIN — Medication 650 MILLIGRAM(S): at 18:28

## 2021-05-28 RX ADMIN — Medication 81 MILLIGRAM(S): at 12:04

## 2021-05-28 RX ADMIN — Medication 0.25 MILLIGRAM(S): at 12:03

## 2021-05-28 RX ADMIN — TRAMADOL HYDROCHLORIDE 50 MILLIGRAM(S): 50 TABLET ORAL at 22:45

## 2021-05-28 RX ADMIN — Medication 1000 UNIT(S): at 12:03

## 2021-05-28 RX ADMIN — RISPERIDONE 0.25 MILLIGRAM(S): 4 TABLET ORAL at 17:57

## 2021-05-28 RX ADMIN — CARBIDOPA AND LEVODOPA 1 TABLET(S): 25; 100 TABLET ORAL at 20:10

## 2021-05-28 RX ADMIN — Medication 0.25 MILLIGRAM(S): at 05:08

## 2021-05-28 RX ADMIN — QUETIAPINE FUMARATE 25 MILLIGRAM(S): 200 TABLET, FILM COATED ORAL at 21:44

## 2021-05-28 RX ADMIN — PRIMIDONE 50 MILLIGRAM(S): 250 TABLET ORAL at 05:09

## 2021-05-28 RX ADMIN — ESCITALOPRAM OXALATE 20 MILLIGRAM(S): 10 TABLET, FILM COATED ORAL at 12:03

## 2021-05-28 RX ADMIN — Medication 650 MILLIGRAM(S): at 06:09

## 2021-05-28 RX ADMIN — LIDOCAINE 1 PATCH: 4 CREAM TOPICAL at 18:28

## 2021-05-28 RX ADMIN — DIVALPROEX SODIUM 500 MILLIGRAM(S): 500 TABLET, DELAYED RELEASE ORAL at 05:10

## 2021-05-28 NOTE — PHYSICAL THERAPY INITIAL EVALUATION ADULT - ADDITIONAL COMMENTS
patient poor historian, confused; reports lives with four young children; reports independent without device PTA

## 2021-05-28 NOTE — PHARMACOTHERAPY INTERVENTION NOTE - COMMENTS
Completed med rec based on information provided by SN[facility] paperwork. Discrepancies relayed to MD.  Vimpat dose adjusted to outpatient regimen (200mg at 9am, 300mg at 5pm).  Primidone dose adjusted to outpatient regimen (50mg, 2 tabs twice a day).

## 2021-05-28 NOTE — PHYSICAL THERAPY INITIAL EVALUATION ADULT - PATIENT/FAMILY/SIGNIFICANT OTHER GOALS STATEMENT, PT EVAL
s/p "leg giving out", right bimalleolar fx. per CT and Xray ankle; CT head revealed no mass effect or hemorrhage return to prior health

## 2021-05-28 NOTE — PROGRESS NOTE ADULT - ASSESSMENT
71M with PMHx of HTN, CAD, CHF, TBI, seizure disorder, dementia, Parkinson's, psychosis was BIB EMS from the [facility] for right ankle pain and swelling s/p fall, found to have right distal tibia and fibular fracture.    #Acute right distal tibia and fibular fracture, suspect large component of polypharmacy (on multiple sedative medications including benzos) and/or dehydration  - right ankle now immobilized  - pain control  - ortho following, non-surgical at this time  - weekly x-rays x 4 weeks, elevation    #Possible alcohol abuse  Questionable if related to fall - patient stated last drink > 1 month ago  Patient's POA (Scarlet) noted many travel sized alcohol bottles in pt's room, but alcohol level < 3 in ED  CIWA scores minimal  folate, thiamine and multivitamin     #Hyponatremia   -IVF  -Repeat BMP in AM    #Hypokalemia: replete, monitor     #CAD   #CHFpEF, CHRONIC  - Was previously on lasix 40mg BID and metolazone 2.5mg mon, wed, fri at home  - serum bicarb 38 on admission, could be contraction alkalosis; lasix decreased to once daily, will also hold metolazone   - check daily weights, no signs of fluid overload at this time  - ASA added  - echo reviewed: EF 55%    #Mood disorder  #Anxiety disorder  - cont lexapro, risperdal, seroquel, mirtazepine and xanax  - these medications are chronic for the patient, will switch xanax to tid from q6h   - QTc 433ms on admission    #Chronic back pain  - Tylenol prn   - baclofen  - lidocaine patch    #history of TBI and seizure disorder  - Vimpat  - Depakote   - Primidone      #Parkinson's and dementia  - sinemet     #Hepatitis C - reactive  - Will need to clarify with patient if any hx of Hep C - will follow-up in AM    #DVT ppx: Lovenox SQ     Updated pt's power of , Scarlet, [phone] regarding plan of care. Awaiting auth / bed acceptance for [facility] 71M with PMHx of HTN, CAD, CHF, TBI, seizure disorder, dementia, Parkinson's, psychosis was BIB EMS from the [facility] for right ankle pain and swelling s/p fall, found to have right distal tibia and fibular fracture.    #Acute right distal tibia and fibular fracture, suspect large component of polypharmacy (on multiple sedative medications including benzos) and/or dehydration  - right ankle now immobilized  - pain control  - ortho following, non-surgical at this time  - weekly x-rays x 4 weeks, elevation    #Possible alcohol abuse  Questionable if related to fall - patient stated last drink > 1 month ago  Patient's POA (Scarlet) noted many travel sized alcohol bottles in pt's room, but alcohol level < 3 in ED  CIWA scores minimal  folate, thiamine and multivitamin     #Hyponatremia   -IVF  -Repeat BMP in AM    #Hypokalemia: replete, monitor     #CAD   #CHFpEF, CHRONIC  - Was previously on lasix 40mg BID and metolazone 2.5mg mon, wed, fri at home  - serum bicarb 38 on admission, could be contraction alkalosis; lasix decreased to once daily, will also hold metolazone   - check daily weights, no signs of fluid overload at this time  - ASA added  - echo reviewed: EF 55%    #Mood disorder  #Anxiety disorder  - cont lexapro, risperdal, seroquel, mirtazepine and xanax  - these medications are chronic for the patient, will switch xanax to q8h from q6h to decrease the dose ... cannot just stop the medication due to risk of withdrawal  - QTc 433ms on admission    #Chronic back pain  - Tylenol prn   - baclofen  - lidocaine patch    #history of TBI and seizure disorder  - Vimpat  - Depakote   - Primidone      #Parkinson's and dementia  - sinemet     #Hepatitis C - reactive  - Will need to clarify with patient if any hx of Hep C - will follow-up in AM    #DVT ppx: Lovenox SQ     Updated pt's power of , Scarlet, [phone] regarding plan of care. Awaiting auth / bed acceptance for [facility]. 71M with PMHx of HTN, CAD, CHF, TBI, seizure disorder, dementia, Parkinson's, psychosis was BIB EMS from the [facility] for right ankle pain and swelling s/p fall, found to have right distal tibia and fibular fracture.    #Acute right distal tibia and fibular fracture, suspect large component of polypharmacy (on multiple sedative medications including benzos) and/or dehydration  - right ankle now immobilized  - pain control  - ortho following, non-surgical at this time  - weekly x-rays x 4 weeks, elevation    #Possible alcohol abuse  Questionable if related to fall - patient stated last drink > 1 month ago  Patient's POA (Scarlet) noted many travel sized alcohol bottles in pt's room, but alcohol level < 3 in ED  CIWA scores minimal  folate, thiamine and multivitamin     #Hyponatremia   -IVF  -Repeat BMP in AM    #Hypokalemia: replete, monitor     #CAD   #CHFpEF, CHRONIC  - Was previously on lasix 40mg BID and metolazone 2.5mg mon, wed, fri at home  - serum bicarb 38 on admission, could be contraction alkalosis; lasix decreased to once daily, will also hold metolazone   - check daily weights, no signs of fluid overload at this time  - ASA added  - echo reviewed: EF 55%    #Mood disorder  #Anxiety disorder  - cont lexapro, risperdal, seroquel, mirtazepine and xanax  - these medications are chronic for the patient, will switch xanax to q8h from q6h to decrease the dose ... cannot just stop the medication due to risk of withdrawal  - QTc 433ms on admission    #Chronic back pain  - Tylenol prn   - baclofen  - lidocaine patch    #history of TBI and seizure disorder  - Vimpat  - Depakote   - Primidone      #Parkinson's and dementia  - sinemet     #Hepatitis C - reactive  - Will need to clarify with patient if any hx of Hep C - will follow-up in AM    #DVT ppx: Lovenox SQ     Updated pt's power of , Scarlet, [phone] regarding plan of care. Awaiting auth / bed acceptance for [facility]. Case d/w [facility] - PT, will need [facility] placement.

## 2021-05-28 NOTE — PROGRESS NOTE ADULT - ASSESSMENT
Assessment and Recommendation:   · Assessment	  Right ankle non-displaced lateral and posterior malleolar fractures.     Problem/Recommendation - 1:  Problem: Bimalleolar fracture, right, closed, initial encounter.   Recommendation:  As the fracture appears to be stable and non-displaced I have advised conservative management with splint followed by cast immobilization.  He needs to remain strictly NWB on the right leg.  We discussed that if the fracture displaces he will require surgery.  He will need f/u x-rays weekly for the first 4 weeks.  Ice, elevation  DVT prophylaxis to be continued   F/u in office after discharge.  Nursing notified of instructions   continue medical management by Medicine   plan and patient status discussed with Dr Soares     Assessment and Recommendation:   · Assessment	  Right ankle non-displaced lateral and posterior malleolar fractures, s/p fall two days ago      Problem/Recommendation - 1:  Problem: Bimalleolar fracture, right, closed, initial encounter.   Recommendation:  As the fracture appears to be stable and non-displaced I have advised conservative management with splint followed by cast immobilization.  He needs to remain strictly NWB on the right leg.  We discussed that if the fracture displaces he will require surgery.  He will need f/u x-rays weekly for the first 4 weeks.  Ice, elevation  DVT prophylaxis to be continued   F/u in office after discharge.  Nursing notified of instructions   continue medical management by Medicine   plan and patient status discussed with Dr Soares

## 2021-05-28 NOTE — PHYSICAL THERAPY INITIAL EVALUATION ADULT - PERTINENT HX OF CURRENT PROBLEM, REHAB EVAL
s/p "leg giving out", right bimalleolar fx. per CT and Xray ankle; CT head revealed no mass effect or hemorrhage

## 2021-05-28 NOTE — PHYSICAL THERAPY INITIAL EVALUATION ADULT - RANGE OF MOTION EXAMINATION, REHAB EVAL
right ankle NT/bilateral upper extremity ROM was WFL (within functional limits)/bilateral lower extremity ROM was WFL (within functional limits)

## 2021-05-29 LAB
ANION GAP SERPL CALC-SCNC: 5 MMOL/L — SIGNIFICANT CHANGE UP (ref 5–17)
BASOPHILS # BLD AUTO: 0.04 K/UL — SIGNIFICANT CHANGE UP (ref 0–0.2)
BASOPHILS NFR BLD AUTO: 0.7 % — SIGNIFICANT CHANGE UP (ref 0–2)
BUN SERPL-MCNC: 23 MG/DL — SIGNIFICANT CHANGE UP (ref 7–23)
CALCIUM SERPL-MCNC: 8.8 MG/DL — SIGNIFICANT CHANGE UP (ref 8.4–10.5)
CHLORIDE SERPL-SCNC: 94 MMOL/L — LOW (ref 96–108)
CO2 SERPL-SCNC: 34 MMOL/L — HIGH (ref 22–31)
CREAT SERPL-MCNC: 0.78 MG/DL — SIGNIFICANT CHANGE UP (ref 0.5–1.3)
EOSINOPHIL # BLD AUTO: 0.34 K/UL — SIGNIFICANT CHANGE UP (ref 0–0.5)
EOSINOPHIL NFR BLD AUTO: 5.8 % — SIGNIFICANT CHANGE UP (ref 0–6)
GLUCOSE SERPL-MCNC: 92 MG/DL — SIGNIFICANT CHANGE UP (ref 70–99)
HCT VFR BLD CALC: 35.3 % — LOW (ref 39–50)
HGB BLD-MCNC: 11.6 G/DL — LOW (ref 13–17)
IMM GRANULOCYTES NFR BLD AUTO: 0.7 % — SIGNIFICANT CHANGE UP (ref 0–1.5)
LYMPHOCYTES # BLD AUTO: 2.06 K/UL — SIGNIFICANT CHANGE UP (ref 1–3.3)
LYMPHOCYTES # BLD AUTO: 35.2 % — SIGNIFICANT CHANGE UP (ref 13–44)
MCHC RBC-ENTMCNC: 28 PG — SIGNIFICANT CHANGE UP (ref 27–34)
MCHC RBC-ENTMCNC: 32.9 GM/DL — SIGNIFICANT CHANGE UP (ref 32–36)
MCV RBC AUTO: 85.3 FL — SIGNIFICANT CHANGE UP (ref 80–100)
MONOCYTES # BLD AUTO: 1.15 K/UL — HIGH (ref 0–0.9)
MONOCYTES NFR BLD AUTO: 19.6 % — HIGH (ref 2–14)
NEUTROPHILS # BLD AUTO: 2.23 K/UL — SIGNIFICANT CHANGE UP (ref 1.8–7.4)
NEUTROPHILS NFR BLD AUTO: 38 % — LOW (ref 43–77)
NRBC # BLD: 0 /100 WBCS — SIGNIFICANT CHANGE UP (ref 0–0)
PLATELET # BLD AUTO: 210 K/UL — SIGNIFICANT CHANGE UP (ref 150–400)
POTASSIUM SERPL-MCNC: 4 MMOL/L — SIGNIFICANT CHANGE UP (ref 3.5–5.3)
POTASSIUM SERPL-SCNC: 4 MMOL/L — SIGNIFICANT CHANGE UP (ref 3.5–5.3)
RBC # BLD: 4.14 M/UL — LOW (ref 4.2–5.8)
RBC # FLD: 13.6 % — SIGNIFICANT CHANGE UP (ref 10.3–14.5)
SODIUM SERPL-SCNC: 133 MMOL/L — LOW (ref 135–145)
WBC # BLD: 5.86 K/UL — SIGNIFICANT CHANGE UP (ref 3.8–10.5)
WBC # FLD AUTO: 5.86 K/UL — SIGNIFICANT CHANGE UP (ref 3.8–10.5)

## 2021-05-29 PROCEDURE — 99232 SBSQ HOSP IP/OBS MODERATE 35: CPT

## 2021-05-29 RX ADMIN — Medication 10 MILLIGRAM(S): at 21:26

## 2021-05-29 RX ADMIN — Medication 0.25 MILLIGRAM(S): at 21:33

## 2021-05-29 RX ADMIN — DIVALPROEX SODIUM 500 MILLIGRAM(S): 500 TABLET, DELAYED RELEASE ORAL at 21:25

## 2021-05-29 RX ADMIN — Medication 81 MILLIGRAM(S): at 12:02

## 2021-05-29 RX ADMIN — RISPERIDONE 0.25 MILLIGRAM(S): 4 TABLET ORAL at 06:44

## 2021-05-29 RX ADMIN — CARBIDOPA AND LEVODOPA 1 TABLET(S): 25; 100 TABLET ORAL at 12:02

## 2021-05-29 RX ADMIN — Medication 1 SPRAY(S): at 06:42

## 2021-05-29 RX ADMIN — Medication 1000 UNIT(S): at 12:02

## 2021-05-29 RX ADMIN — Medication 650 MILLIGRAM(S): at 17:44

## 2021-05-29 RX ADMIN — DIVALPROEX SODIUM 500 MILLIGRAM(S): 500 TABLET, DELAYED RELEASE ORAL at 06:41

## 2021-05-29 RX ADMIN — PRIMIDONE 100 MILLIGRAM(S): 250 TABLET ORAL at 06:43

## 2021-05-29 RX ADMIN — ESCITALOPRAM OXALATE 20 MILLIGRAM(S): 10 TABLET, FILM COATED ORAL at 12:02

## 2021-05-29 RX ADMIN — Medication 0.25 MILLIGRAM(S): at 13:40

## 2021-05-29 RX ADMIN — PRIMIDONE 100 MILLIGRAM(S): 250 TABLET ORAL at 17:45

## 2021-05-29 RX ADMIN — CARBIDOPA AND LEVODOPA 1 TABLET(S): 25; 100 TABLET ORAL at 20:40

## 2021-05-29 RX ADMIN — LACOSAMIDE 300 MILLIGRAM(S): 50 TABLET ORAL at 17:43

## 2021-05-29 RX ADMIN — GABAPENTIN 300 MILLIGRAM(S): 400 CAPSULE ORAL at 21:33

## 2021-05-29 RX ADMIN — LIDOCAINE 1 PATCH: 4 CREAM TOPICAL at 12:03

## 2021-05-29 RX ADMIN — LIDOCAINE 1 PATCH: 4 CREAM TOPICAL at 21:20

## 2021-05-29 RX ADMIN — Medication 25 MICROGRAM(S): at 06:44

## 2021-05-29 RX ADMIN — CARBIDOPA AND LEVODOPA 1 TABLET(S): 25; 100 TABLET ORAL at 15:32

## 2021-05-29 RX ADMIN — Medication 1 MILLIGRAM(S): at 12:02

## 2021-05-29 RX ADMIN — DIVALPROEX SODIUM 500 MILLIGRAM(S): 500 TABLET, DELAYED RELEASE ORAL at 13:39

## 2021-05-29 RX ADMIN — MIRTAZAPINE 7.5 MILLIGRAM(S): 45 TABLET, ORALLY DISINTEGRATING ORAL at 21:27

## 2021-05-29 RX ADMIN — QUETIAPINE FUMARATE 25 MILLIGRAM(S): 200 TABLET, FILM COATED ORAL at 21:26

## 2021-05-29 RX ADMIN — Medication 650 MILLIGRAM(S): at 06:39

## 2021-05-29 RX ADMIN — CARBIDOPA AND LEVODOPA 1 TABLET(S): 25; 100 TABLET ORAL at 06:40

## 2021-05-29 RX ADMIN — RISPERIDONE 0.25 MILLIGRAM(S): 4 TABLET ORAL at 17:45

## 2021-05-29 RX ADMIN — LACOSAMIDE 200 MILLIGRAM(S): 50 TABLET ORAL at 08:09

## 2021-05-29 RX ADMIN — MEMANTINE HYDROCHLORIDE 10 MILLIGRAM(S): 10 TABLET ORAL at 21:27

## 2021-05-29 RX ADMIN — Medication 650 MILLIGRAM(S): at 18:15

## 2021-05-29 RX ADMIN — SODIUM CHLORIDE 75 MILLILITER(S): 9 INJECTION INTRAMUSCULAR; INTRAVENOUS; SUBCUTANEOUS at 21:28

## 2021-05-29 RX ADMIN — Medication 100 MILLIGRAM(S): at 12:02

## 2021-05-29 RX ADMIN — ENOXAPARIN SODIUM 40 MILLIGRAM(S): 100 INJECTION SUBCUTANEOUS at 12:02

## 2021-05-29 RX ADMIN — SENNA PLUS 2 TABLET(S): 8.6 TABLET ORAL at 21:26

## 2021-05-29 RX ADMIN — Medication 650 MILLIGRAM(S): at 07:25

## 2021-05-29 RX ADMIN — Medication 0.25 MILLIGRAM(S): at 06:46

## 2021-05-29 RX ADMIN — Medication 40 MILLIGRAM(S): at 06:42

## 2021-05-29 RX ADMIN — PANTOPRAZOLE SODIUM 40 MILLIGRAM(S): 20 TABLET, DELAYED RELEASE ORAL at 06:40

## 2021-05-29 RX ADMIN — Medication 1 TABLET(S): at 12:02

## 2021-05-29 NOTE — PROGRESS NOTE ADULT - ASSESSMENT
71M with PMHx of HTN, CAD, CHF, TBI, seizure disorder, dementia, Parkinson's, psychosis was BIB EMS from the [facility] for right ankle pain and swelling s/p fall, found to have right distal tibia and fibular fracture.    #Acute right distal tibia and fibular fracture, suspect large component of polypharmacy (on multiple sedative medications including benzos) and/or dehydration  - right ankle now immobilized  - pain control  - ortho following, non-surgical at this time  - weekly x-rays x 4 weeks, elevation    #Possible history of alcohol abuse  -folate, thiamine and multivitamin   -no signs of any withdrawal    #Hyponatremia - improving  -IVF  -Repeat BMP in AM    #Hypokalemia: repleted, stable     #CAD   #CHFpEF, CHRONIC  - Was previously on lasix 40mg BID and metolazone 2.5mg mon, wed, fri at home  - serum bicarb 38 on admission, could be contraction alkalosis; lasix decreased to once daily, metolazone on hold  - check daily weights, no signs of fluid overload at this time  - ASA added  - echo reviewed: EF 55%  - check daily weights    #Mood disorder  #Anxiety disorder  - cont lexapro, risperdal, seroquel, mirtazepine and xanax  - these medications are chronic for the patient, xanax changed from q8h from q6h to decrease the dose in case fall related to polypharmacy ... cannot just stop the medication due to risk of withdrawal    #Chronic back pain  - Tylenol prn   - baclofen  - lidocaine patch    #history of TBI and seizure disorder  - Vimpat  - Depakote   - Primidone      #Parkinson's and dementia  - sinemet     #Hepatitis C - reactive  - Will need to clarify with patient if any hx of Hep C - will follow-up in AM    #DVT ppx: Lovenox SQ     Dispo: D/C to [facility] when auth obtained 71M with PMHx of HTN, CAD, CHF, TBI, seizure disorder, dementia, Parkinson's, psychosis was BIB EMS from the [facility] for right ankle pain and swelling s/p fall, found to have right distal tibia and fibular fracture.    #Acute right distal tibia and fibular fracture, suspect large component of polypharmacy (on multiple sedative medications including benzos) and/or dehydration  - right ankle now immobilized  - pain control  - ortho following, non-surgical at this time  - weekly x-rays x 4 weeks, elevation    #Possible history of alcohol abuse  -folate, thiamine and multivitamin   -no signs of any withdrawal    #Hyponatremia - improving  -Repeat BMP in AM    #Hypokalemia: repleted, stable     #CAD   #CHFpEF, CHRONIC  - Was previously on lasix 40mg BID and metolazone 2.5mg mon, wed, fri at home  - serum bicarb 38 on admission, could be contraction alkalosis; lasix decreased to once daily, metolazone on hold  - check daily weights, no signs of fluid overload at this time  - ASA added  - echo reviewed: EF 55%  - check daily weights    #Mood disorder  #Anxiety disorder  - cont lexapro, risperdal, seroquel, mirtazepine and xanax  - these medications are chronic for the patient, xanax changed from q8h from q6h to decrease the dose in case fall related to polypharmacy ... cannot just stop the medication due to risk of withdrawal    #Essential HTN:   - d/c IVF at this time    #Chronic back pain  - Tylenol prn   - baclofen  - lidocaine patch    #history of TBI and seizure disorder  - Vimpat  - Depakote   - Primidone      #Parkinson's and dementia  - sinemet     #Hepatitis C - reactive  - Will need to clarify with patient if any hx of Hep C - will follow-up in AM    #DVT ppx: Lovenox SQ     Dispo: D/C to [facility] when auth obtained

## 2021-05-30 LAB
ANION GAP SERPL CALC-SCNC: 7 MMOL/L — SIGNIFICANT CHANGE UP (ref 5–17)
BUN SERPL-MCNC: 18 MG/DL — SIGNIFICANT CHANGE UP (ref 7–23)
CALCIUM SERPL-MCNC: 8.7 MG/DL — SIGNIFICANT CHANGE UP (ref 8.4–10.5)
CHLORIDE SERPL-SCNC: 95 MMOL/L — LOW (ref 96–108)
CO2 SERPL-SCNC: 30 MMOL/L — SIGNIFICANT CHANGE UP (ref 22–31)
CREAT SERPL-MCNC: 0.78 MG/DL — SIGNIFICANT CHANGE UP (ref 0.5–1.3)
GLUCOSE SERPL-MCNC: 88 MG/DL — SIGNIFICANT CHANGE UP (ref 70–99)
POTASSIUM SERPL-MCNC: 4.1 MMOL/L — SIGNIFICANT CHANGE UP (ref 3.5–5.3)
POTASSIUM SERPL-SCNC: 4.1 MMOL/L — SIGNIFICANT CHANGE UP (ref 3.5–5.3)
SODIUM SERPL-SCNC: 132 MMOL/L — LOW (ref 135–145)

## 2021-05-30 PROCEDURE — 99232 SBSQ HOSP IP/OBS MODERATE 35: CPT

## 2021-05-30 RX ORDER — AMLODIPINE BESYLATE 2.5 MG/1
2.5 TABLET ORAL DAILY
Refills: 0 | Status: DISCONTINUED | OUTPATIENT
Start: 2021-05-30 | End: 2021-05-30

## 2021-05-30 RX ADMIN — RISPERIDONE 0.25 MILLIGRAM(S): 4 TABLET ORAL at 06:02

## 2021-05-30 RX ADMIN — PRIMIDONE 100 MILLIGRAM(S): 250 TABLET ORAL at 17:16

## 2021-05-30 RX ADMIN — CARBIDOPA AND LEVODOPA 1 TABLET(S): 25; 100 TABLET ORAL at 06:00

## 2021-05-30 RX ADMIN — Medication 650 MILLIGRAM(S): at 18:00

## 2021-05-30 RX ADMIN — LIDOCAINE 1 PATCH: 4 CREAM TOPICAL at 00:00

## 2021-05-30 RX ADMIN — QUETIAPINE FUMARATE 25 MILLIGRAM(S): 200 TABLET, FILM COATED ORAL at 22:06

## 2021-05-30 RX ADMIN — LIDOCAINE 1 PATCH: 4 CREAM TOPICAL at 22:07

## 2021-05-30 RX ADMIN — MEMANTINE HYDROCHLORIDE 10 MILLIGRAM(S): 10 TABLET ORAL at 22:06

## 2021-05-30 RX ADMIN — CARBIDOPA AND LEVODOPA 1 TABLET(S): 25; 100 TABLET ORAL at 21:58

## 2021-05-30 RX ADMIN — Medication 1000 UNIT(S): at 11:47

## 2021-05-30 RX ADMIN — Medication 25 MICROGRAM(S): at 06:02

## 2021-05-30 RX ADMIN — GABAPENTIN 300 MILLIGRAM(S): 400 CAPSULE ORAL at 22:05

## 2021-05-30 RX ADMIN — Medication 1 TABLET(S): at 11:48

## 2021-05-30 RX ADMIN — Medication 0.25 MILLIGRAM(S): at 13:21

## 2021-05-30 RX ADMIN — Medication 40 MILLIGRAM(S): at 06:02

## 2021-05-30 RX ADMIN — ENOXAPARIN SODIUM 40 MILLIGRAM(S): 100 INJECTION SUBCUTANEOUS at 11:47

## 2021-05-30 RX ADMIN — SENNA PLUS 2 TABLET(S): 8.6 TABLET ORAL at 22:04

## 2021-05-30 RX ADMIN — DIVALPROEX SODIUM 500 MILLIGRAM(S): 500 TABLET, DELAYED RELEASE ORAL at 13:22

## 2021-05-30 RX ADMIN — Medication 0.25 MILLIGRAM(S): at 06:06

## 2021-05-30 RX ADMIN — LACOSAMIDE 300 MILLIGRAM(S): 50 TABLET ORAL at 17:16

## 2021-05-30 RX ADMIN — PRIMIDONE 100 MILLIGRAM(S): 250 TABLET ORAL at 06:03

## 2021-05-30 RX ADMIN — Medication 650 MILLIGRAM(S): at 07:08

## 2021-05-30 RX ADMIN — TRAMADOL HYDROCHLORIDE 50 MILLIGRAM(S): 50 TABLET ORAL at 06:06

## 2021-05-30 RX ADMIN — CARBIDOPA AND LEVODOPA 1 TABLET(S): 25; 100 TABLET ORAL at 15:27

## 2021-05-30 RX ADMIN — MIRTAZAPINE 7.5 MILLIGRAM(S): 45 TABLET, ORALLY DISINTEGRATING ORAL at 22:06

## 2021-05-30 RX ADMIN — PANTOPRAZOLE SODIUM 40 MILLIGRAM(S): 20 TABLET, DELAYED RELEASE ORAL at 06:02

## 2021-05-30 RX ADMIN — RISPERIDONE 0.25 MILLIGRAM(S): 4 TABLET ORAL at 17:17

## 2021-05-30 RX ADMIN — Medication 100 MILLIGRAM(S): at 11:49

## 2021-05-30 RX ADMIN — ESCITALOPRAM OXALATE 20 MILLIGRAM(S): 10 TABLET, FILM COATED ORAL at 11:48

## 2021-05-30 RX ADMIN — LACOSAMIDE 200 MILLIGRAM(S): 50 TABLET ORAL at 08:00

## 2021-05-30 RX ADMIN — Medication 1 MILLIGRAM(S): at 11:48

## 2021-05-30 RX ADMIN — CARBIDOPA AND LEVODOPA 1 TABLET(S): 25; 100 TABLET ORAL at 11:46

## 2021-05-30 RX ADMIN — Medication 1 SPRAY(S): at 06:01

## 2021-05-30 RX ADMIN — LIDOCAINE 1 PATCH: 4 CREAM TOPICAL at 11:47

## 2021-05-30 RX ADMIN — Medication 650 MILLIGRAM(S): at 17:17

## 2021-05-30 RX ADMIN — Medication 81 MILLIGRAM(S): at 11:46

## 2021-05-30 RX ADMIN — Medication 0.25 MILLIGRAM(S): at 22:04

## 2021-05-30 RX ADMIN — Medication 650 MILLIGRAM(S): at 05:57

## 2021-05-30 RX ADMIN — TRAMADOL HYDROCHLORIDE 50 MILLIGRAM(S): 50 TABLET ORAL at 07:05

## 2021-05-30 RX ADMIN — Medication 10 MILLIGRAM(S): at 22:06

## 2021-05-30 RX ADMIN — DIVALPROEX SODIUM 500 MILLIGRAM(S): 500 TABLET, DELAYED RELEASE ORAL at 22:05

## 2021-05-30 RX ADMIN — DIVALPROEX SODIUM 500 MILLIGRAM(S): 500 TABLET, DELAYED RELEASE ORAL at 05:56

## 2021-05-30 NOTE — PROGRESS NOTE ADULT - ASSESSMENT
71M with PMHx of HTN, CAD, CHF, TBI, seizure disorder, dementia, Parkinson's, psychosis was BIB EMS from the [facility] for right ankle pain and swelling s/p fall, found to have right distal tibia and fibular fracture.    #Acute right distal tibia and fibular fracture, suspect large component of polypharmacy (on multiple sedative medications including benzos) and/or dehydration  - right ankle now immobilized  - pain control  - ortho following, non-surgical at this time  - weekly x-rays x 4 weeks, elevation    #Possible history of alcohol abuse  -folate, thiamine and multivitamin   -no signs of any withdrawal    #Hyponatremia - stable  -Encourage PO intake    #Hypokalemia: repleted, stable     #CAD   #CHFpEF, CHRONIC  - Was previously on lasix 40mg BID and metolazone 2.5mg mon, wed, fri at home  - serum bicarb 38 on admission, could be contraction alkalosis; lasix decreased to once daily, metolazone on hold --> alkalosis is improved  - no signs of fluid overload at this time  - ASA added  - echo reviewed: EF 55%    #Mood disorder  #Anxiety disorder  - cont lexapro, risperdal, seroquel, mirtazepine and xanax  - these medications are chronic for the patient, xanax changed from q8h from q6h to decrease the dose in case fall related to polypharmacy ... cannot just stop the medication due to risk of withdrawal.... change xanax to bid dosing starting tomorrow    #Chronic back pain  - Tylenol prn   - baclofen  - lidocaine patch    #history of TBI and seizure disorder  - Vimpat  - Depakote   - Primidone      #Parkinson's and dementia  - sinemet     #Hepatitis C - reactive  - Spoke to patient, + known history of Hep C (does not know how he contracted it)  - Outpatient follow-up    #DVT ppx: Lovenox SQ     Dispo: D/C to [facility] when auth obtained, medically stable for dc

## 2021-05-31 LAB
ANION GAP SERPL CALC-SCNC: 8 MMOL/L — SIGNIFICANT CHANGE UP (ref 5–17)
BUN SERPL-MCNC: 16 MG/DL — SIGNIFICANT CHANGE UP (ref 7–23)
CALCIUM SERPL-MCNC: 9 MG/DL — SIGNIFICANT CHANGE UP (ref 8.4–10.5)
CHLORIDE SERPL-SCNC: 96 MMOL/L — SIGNIFICANT CHANGE UP (ref 96–108)
CO2 SERPL-SCNC: 31 MMOL/L — SIGNIFICANT CHANGE UP (ref 22–31)
CREAT SERPL-MCNC: 0.77 MG/DL — SIGNIFICANT CHANGE UP (ref 0.5–1.3)
GLUCOSE SERPL-MCNC: 87 MG/DL — SIGNIFICANT CHANGE UP (ref 70–99)
POTASSIUM SERPL-MCNC: 4.1 MMOL/L — SIGNIFICANT CHANGE UP (ref 3.5–5.3)
POTASSIUM SERPL-SCNC: 4.1 MMOL/L — SIGNIFICANT CHANGE UP (ref 3.5–5.3)
SODIUM SERPL-SCNC: 135 MMOL/L — SIGNIFICANT CHANGE UP (ref 135–145)

## 2021-05-31 PROCEDURE — 99232 SBSQ HOSP IP/OBS MODERATE 35: CPT

## 2021-05-31 RX ADMIN — PANTOPRAZOLE SODIUM 40 MILLIGRAM(S): 20 TABLET, DELAYED RELEASE ORAL at 05:49

## 2021-05-31 RX ADMIN — Medication 40 MILLIGRAM(S): at 05:47

## 2021-05-31 RX ADMIN — Medication 650 MILLIGRAM(S): at 17:04

## 2021-05-31 RX ADMIN — CARBIDOPA AND LEVODOPA 1 TABLET(S): 25; 100 TABLET ORAL at 17:03

## 2021-05-31 RX ADMIN — Medication 0.25 MILLIGRAM(S): at 05:44

## 2021-05-31 RX ADMIN — CARBIDOPA AND LEVODOPA 1 TABLET(S): 25; 100 TABLET ORAL at 21:24

## 2021-05-31 RX ADMIN — Medication 1000 UNIT(S): at 11:29

## 2021-05-31 RX ADMIN — DIVALPROEX SODIUM 500 MILLIGRAM(S): 500 TABLET, DELAYED RELEASE ORAL at 22:06

## 2021-05-31 RX ADMIN — PRIMIDONE 100 MILLIGRAM(S): 250 TABLET ORAL at 05:47

## 2021-05-31 RX ADMIN — Medication 100 MILLIGRAM(S): at 11:29

## 2021-05-31 RX ADMIN — ENOXAPARIN SODIUM 40 MILLIGRAM(S): 100 INJECTION SUBCUTANEOUS at 11:29

## 2021-05-31 RX ADMIN — LIDOCAINE 1 PATCH: 4 CREAM TOPICAL at 00:00

## 2021-05-31 RX ADMIN — MEMANTINE HYDROCHLORIDE 10 MILLIGRAM(S): 10 TABLET ORAL at 22:06

## 2021-05-31 RX ADMIN — DIVALPROEX SODIUM 500 MILLIGRAM(S): 500 TABLET, DELAYED RELEASE ORAL at 05:45

## 2021-05-31 RX ADMIN — Medication 1 TABLET(S): at 11:29

## 2021-05-31 RX ADMIN — GABAPENTIN 300 MILLIGRAM(S): 400 CAPSULE ORAL at 22:06

## 2021-05-31 RX ADMIN — Medication 81 MILLIGRAM(S): at 11:29

## 2021-05-31 RX ADMIN — Medication 650 MILLIGRAM(S): at 14:00

## 2021-05-31 RX ADMIN — SENNA PLUS 2 TABLET(S): 8.6 TABLET ORAL at 22:06

## 2021-05-31 RX ADMIN — Medication 650 MILLIGRAM(S): at 05:44

## 2021-05-31 RX ADMIN — Medication 1 SPRAY(S): at 05:45

## 2021-05-31 RX ADMIN — CARBIDOPA AND LEVODOPA 1 TABLET(S): 25; 100 TABLET ORAL at 11:29

## 2021-05-31 RX ADMIN — Medication 650 MILLIGRAM(S): at 17:45

## 2021-05-31 RX ADMIN — Medication 1 MILLIGRAM(S): at 11:29

## 2021-05-31 RX ADMIN — PRIMIDONE 100 MILLIGRAM(S): 250 TABLET ORAL at 17:04

## 2021-05-31 RX ADMIN — LIDOCAINE 1 PATCH: 4 CREAM TOPICAL at 11:29

## 2021-05-31 RX ADMIN — Medication 0.25 MILLIGRAM(S): at 13:57

## 2021-05-31 RX ADMIN — RISPERIDONE 0.25 MILLIGRAM(S): 4 TABLET ORAL at 17:05

## 2021-05-31 RX ADMIN — LACOSAMIDE 200 MILLIGRAM(S): 50 TABLET ORAL at 08:13

## 2021-05-31 RX ADMIN — LIDOCAINE 1 PATCH: 4 CREAM TOPICAL at 19:47

## 2021-05-31 RX ADMIN — Medication 10 MILLIGRAM(S): at 22:06

## 2021-05-31 RX ADMIN — CARBIDOPA AND LEVODOPA 1 TABLET(S): 25; 100 TABLET ORAL at 05:44

## 2021-05-31 RX ADMIN — LIDOCAINE 1 PATCH: 4 CREAM TOPICAL at 23:00

## 2021-05-31 RX ADMIN — LACOSAMIDE 300 MILLIGRAM(S): 50 TABLET ORAL at 17:07

## 2021-05-31 RX ADMIN — MIRTAZAPINE 7.5 MILLIGRAM(S): 45 TABLET, ORALLY DISINTEGRATING ORAL at 22:06

## 2021-05-31 RX ADMIN — ESCITALOPRAM OXALATE 20 MILLIGRAM(S): 10 TABLET, FILM COATED ORAL at 11:29

## 2021-05-31 RX ADMIN — DIVALPROEX SODIUM 500 MILLIGRAM(S): 500 TABLET, DELAYED RELEASE ORAL at 13:56

## 2021-05-31 RX ADMIN — Medication 25 MICROGRAM(S): at 05:45

## 2021-05-31 RX ADMIN — QUETIAPINE FUMARATE 25 MILLIGRAM(S): 200 TABLET, FILM COATED ORAL at 22:06

## 2021-05-31 RX ADMIN — Medication 650 MILLIGRAM(S): at 14:30

## 2021-05-31 RX ADMIN — Medication 0.25 MILLIGRAM(S): at 22:05

## 2021-05-31 RX ADMIN — RISPERIDONE 0.25 MILLIGRAM(S): 4 TABLET ORAL at 05:48

## 2021-05-31 NOTE — PROGRESS NOTE ADULT - ASSESSMENT
71M with PMHx of HTN, CAD, CHF, TBI, seizure disorder, dementia, Parkinson's, psychosis was BIB EMS from the [facility] for right ankle pain and swelling s/p fall, found to have right distal tibia and fibular fracture.    #Acute right distal tibia and fibular fracture, suspect large component of polypharmacy (on multiple sedative medications including benzos) and/or dehydration  - right ankle now immobilized  - pain control  - ortho following, non-surgical at this time  - weekly x-rays x 4 weeks, elevation    #Possible history of alcohol abuse  -folate, thiamine and multivitamin   -no signs of any withdrawal    #Hyponatremia - resolved  -Encourage PO intake    #Hypokalemia: repleted, stable     #CAD   #CHFpEF, CHRONIC  - Was previously on lasix 40mg BID and metolazone 2.5mg mon, wed, fri at home ... this could have contributed to the hyponatremia   - serum bicarb 38 on admission, could be contraction alkalosis; lasix decreased to once daily, metolazone on hold --> alkalosis is improved  - no signs of fluid overload at this time  - ASA added  - echo reviewed: EF 55%    #Mood disorder  #Anxiety disorder  - cont lexapro, risperdal, seroquel, mirtazepine and xanax  - these medications are chronic for the patient, xanax changed from q8h from q6h to decrease the dose in case fall related to polypharmacy ... cannot just stop the medication due to risk of withdrawal.... will now further decrease dose to bid to decrease cumulative effects of multiple sedative medications    #Chronic back pain  - Tylenol prn   - baclofen  - lidocaine patch    #history of TBI and seizure disorder  - Vimpat  - Depakote   - Primidone      #Parkinson's and dementia  - sinemet     #Hepatitis C - reactive  - Spoke to patient, + known history of Hep C (does not know how he contracted it)  - Outpatient follow-up    #DVT ppx: Lovenox SQ     Dispo: D/C to [facility] when auth obtained, medically stable for dc     Case d/w patient's POA, Corinne Lingchauncey, [phone] - all questions answered and she agrees with the plan     71M with PMHx of HTN, CAD, CHF, TBI, seizure disorder, dementia, Parkinson's, psychosis was BIB EMS from the [facility] for right ankle pain and swelling s/p fall, found to have right distal tibia and fibular fracture.    #Acute right distal tibia and fibular fracture, suspect large component of polypharmacy (on multiple sedative medications including benzos) and/or dehydration  - right ankle now immobilized  - pain control  - ortho following, non-surgical at this time  - weekly x-rays x 4 weeks, elevation    #Possible history of alcohol abuse  -folate, thiamine and multivitamin   -no signs of any withdrawal    #Hyponatremia - resolved  -Encourage PO intake    #Hypokalemia: repleted, stable     #CAD   #CHFpEF, CHRONIC  - Was previously on lasix 40mg BID and metolazone 2.5mg mon, wed, fri at home ... this could have contributed to the hyponatremia   - serum bicarb 38 on admission, could be contraction alkalosis; lasix decreased to once daily, metolazone on hold --> alkalosis is improved  - no signs of fluid overload at this time  - ASA added  - echo reviewed: EF 55%    #Mood disorder  #Anxiety disorder  - cont lexapro, risperdal, seroquel, mirtazepine and xanax  - these medications are chronic for the patient, xanax changed from q8h from q6h to decrease the dose in case fall related to polypharmacy ... cannot just stop the medication due to risk of withdrawal.... will now further decrease dose to bid to decrease cumulative effects of multiple sedative medications    #Chronic back pain  - Tylenol prn   - baclofen  - lidocaine patch    #history of TBI and seizure disorder  - Vimpat  - Depakote   - Primidone      #Parkinson's and dementia  - sinemet     #HTN  -BP 140s-150s  -fluids stopped yesterday afternoon  -if persists another 24 hours, will start low dose Norvasc    #Hepatitis C - reactive  - Spoke to patient, + known history of Hep C (does not know how he contracted it)  - Outpatient follow-up    #DVT ppx: Lovenox SQ     Dispo: D/C to [facility] when auth obtained, medically stable for dc     Case d/w patient's LUDAA, Corinne Lingebach, [phone] - all questions answered and she agrees with the plan     71M with PMHx of HTN, CAD, CHF, TBI, seizure disorder, dementia, Parkinson's, psychosis was BIB EMS from the [facility] for right ankle pain and swelling s/p fall, found to have right distal tibia and fibular fracture.    #Acute right distal tibia and fibular fracture, suspect large component of polypharmacy (on multiple sedative medications including benzos) and/or dehydration  - right ankle now immobilized  - pain control  - ortho following, non-surgical at this time  - weekly x-rays x 4 weeks, elevation    #Possible history of alcohol abuse  -folate, thiamine and multivitamin   -no signs of any withdrawal    #Hyponatremia - resolved  -Encourage PO intake    #Hypokalemia: repleted, stable     #CAD   #CHFpEF, CHRONIC  - Was previously on lasix 40mg BID and metolazone 2.5mg mon, wed, fri at home ... this could have contributed to the hyponatremia   - serum bicarb 38 on admission, could be contraction alkalosis; lasix decreased to once daily, metolazone on hold --> alkalosis is improved  - no signs of fluid overload at this time  - ASA added  - echo reviewed: EF 55%    #Mood disorder  #Anxiety disorder  - cont lexapro, risperdal, seroquel, mirtazepine and xanax  - these medications are chronic for the patient, xanax changed from q8h from q6h to decrease the dose in case fall related to polypharmacy ... cannot just stop the medication due to risk of withdrawal.... will now further decrease dose to bid to decrease cumulative effects of multiple sedative medications    #Chronic back pain  - Tylenol prn   - baclofen  - lidocaine patch    #history of TBI and seizure disorder  - Vimpat  - Depakote   - Primidone      #Parkinson's and dementia  - sinemet     #HTN  -BP 140s-150s  -fluids stopped yesterday afternoon  -if persists another 24 hours, will start low dose beta blocker    #Hepatitis C - reactive  - Spoke to patient, + known history of Hep C (does not know how he contracted it)  - Outpatient follow-up    #DVT ppx: Lovenox SQ     Dispo: D/C to [facility] when auth obtained, medically stable for dc     Case d/w patient's LUDAA, Corinne Lingebach, [phone] - all questions answered and she agrees with the plan

## 2021-06-01 LAB — HCV RNA FLD QL NAA+PROBE: SIGNIFICANT CHANGE UP

## 2021-06-01 PROCEDURE — 99232 SBSQ HOSP IP/OBS MODERATE 35: CPT

## 2021-06-01 RX ORDER — ALPRAZOLAM 0.25 MG
0.25 TABLET ORAL
Refills: 0 | Status: DISCONTINUED | OUTPATIENT
Start: 2021-06-01 | End: 2021-06-02

## 2021-06-01 RX ORDER — METOPROLOL TARTRATE 50 MG
12.5 TABLET ORAL
Refills: 0 | Status: DISCONTINUED | OUTPATIENT
Start: 2021-06-01 | End: 2021-06-02

## 2021-06-01 RX ADMIN — PRIMIDONE 100 MILLIGRAM(S): 250 TABLET ORAL at 05:36

## 2021-06-01 RX ADMIN — DIVALPROEX SODIUM 500 MILLIGRAM(S): 500 TABLET, DELAYED RELEASE ORAL at 05:34

## 2021-06-01 RX ADMIN — MIRTAZAPINE 7.5 MILLIGRAM(S): 45 TABLET, ORALLY DISINTEGRATING ORAL at 22:14

## 2021-06-01 RX ADMIN — Medication 650 MILLIGRAM(S): at 11:53

## 2021-06-01 RX ADMIN — Medication 0.25 MILLIGRAM(S): at 05:34

## 2021-06-01 RX ADMIN — CARBIDOPA AND LEVODOPA 1 TABLET(S): 25; 100 TABLET ORAL at 18:33

## 2021-06-01 RX ADMIN — Medication 1 SPRAY(S): at 05:36

## 2021-06-01 RX ADMIN — ESCITALOPRAM OXALATE 20 MILLIGRAM(S): 10 TABLET, FILM COATED ORAL at 11:53

## 2021-06-01 RX ADMIN — Medication 650 MILLIGRAM(S): at 18:34

## 2021-06-01 RX ADMIN — PRIMIDONE 100 MILLIGRAM(S): 250 TABLET ORAL at 18:33

## 2021-06-01 RX ADMIN — CARBIDOPA AND LEVODOPA 1 TABLET(S): 25; 100 TABLET ORAL at 05:35

## 2021-06-01 RX ADMIN — RISPERIDONE 0.25 MILLIGRAM(S): 4 TABLET ORAL at 18:34

## 2021-06-01 RX ADMIN — LIDOCAINE 1 PATCH: 4 CREAM TOPICAL at 19:47

## 2021-06-01 RX ADMIN — Medication 650 MILLIGRAM(S): at 06:36

## 2021-06-01 RX ADMIN — Medication 1 MILLIGRAM(S): at 11:53

## 2021-06-01 RX ADMIN — DIVALPROEX SODIUM 500 MILLIGRAM(S): 500 TABLET, DELAYED RELEASE ORAL at 22:15

## 2021-06-01 RX ADMIN — OXYCODONE HYDROCHLORIDE 5 MILLIGRAM(S): 5 TABLET ORAL at 20:55

## 2021-06-01 RX ADMIN — Medication 1 TABLET(S): at 11:53

## 2021-06-01 RX ADMIN — Medication 0.25 MILLIGRAM(S): at 18:34

## 2021-06-01 RX ADMIN — GABAPENTIN 300 MILLIGRAM(S): 400 CAPSULE ORAL at 22:14

## 2021-06-01 RX ADMIN — MEMANTINE HYDROCHLORIDE 10 MILLIGRAM(S): 10 TABLET ORAL at 22:14

## 2021-06-01 RX ADMIN — OXYCODONE HYDROCHLORIDE 5 MILLIGRAM(S): 5 TABLET ORAL at 21:50

## 2021-06-01 RX ADMIN — ENOXAPARIN SODIUM 40 MILLIGRAM(S): 100 INJECTION SUBCUTANEOUS at 11:54

## 2021-06-01 RX ADMIN — QUETIAPINE FUMARATE 25 MILLIGRAM(S): 200 TABLET, FILM COATED ORAL at 22:14

## 2021-06-01 RX ADMIN — Medication 10 MILLIGRAM(S): at 22:14

## 2021-06-01 RX ADMIN — SENNA PLUS 2 TABLET(S): 8.6 TABLET ORAL at 22:14

## 2021-06-01 RX ADMIN — Medication 650 MILLIGRAM(S): at 12:53

## 2021-06-01 RX ADMIN — Medication 12.5 MILLIGRAM(S): at 18:33

## 2021-06-01 RX ADMIN — LACOSAMIDE 200 MILLIGRAM(S): 50 TABLET ORAL at 10:05

## 2021-06-01 RX ADMIN — Medication 1000 UNIT(S): at 11:53

## 2021-06-01 RX ADMIN — Medication 650 MILLIGRAM(S): at 19:30

## 2021-06-01 RX ADMIN — Medication 25 MICROGRAM(S): at 05:35

## 2021-06-01 RX ADMIN — Medication 81 MILLIGRAM(S): at 11:53

## 2021-06-01 RX ADMIN — RISPERIDONE 0.25 MILLIGRAM(S): 4 TABLET ORAL at 05:35

## 2021-06-01 RX ADMIN — Medication 100 MILLIGRAM(S): at 11:53

## 2021-06-01 RX ADMIN — LIDOCAINE 1 PATCH: 4 CREAM TOPICAL at 23:50

## 2021-06-01 RX ADMIN — CARBIDOPA AND LEVODOPA 1 TABLET(S): 25; 100 TABLET ORAL at 20:39

## 2021-06-01 RX ADMIN — Medication 650 MILLIGRAM(S): at 05:36

## 2021-06-01 RX ADMIN — DIVALPROEX SODIUM 500 MILLIGRAM(S): 500 TABLET, DELAYED RELEASE ORAL at 14:16

## 2021-06-01 RX ADMIN — LIDOCAINE 1 PATCH: 4 CREAM TOPICAL at 11:57

## 2021-06-01 RX ADMIN — Medication 40 MILLIGRAM(S): at 05:35

## 2021-06-01 RX ADMIN — PANTOPRAZOLE SODIUM 40 MILLIGRAM(S): 20 TABLET, DELAYED RELEASE ORAL at 05:39

## 2021-06-01 RX ADMIN — CARBIDOPA AND LEVODOPA 1 TABLET(S): 25; 100 TABLET ORAL at 11:53

## 2021-06-01 RX ADMIN — LACOSAMIDE 300 MILLIGRAM(S): 50 TABLET ORAL at 18:33

## 2021-06-01 NOTE — DIETITIAN INITIAL EVALUATION ADULT. - PERTINENT MEDS FT
- - - aspirin lovenox depakote sinemet lexapro lasix neurontin vimpat synthroid namenda remeron protonix seroquel risperdal

## 2021-06-01 NOTE — DIETITIAN INITIAL EVALUATION ADULT. - NSPROEDAABILITYLEARN_GEN_A_NUR
Date of Birth: [birth year]	Time of Birth:     Admission Weight (g): 2260   Admission Date and Time:  08-15-19 @ 14:54         Gestational Age: 34.3      Source of admission [x] Inborn     [ __ ]Transport from    [facility]:  Baby girl born at 34 +3 wks via r/p CS due to breech in  labor to 37 yo , A+ blood type mother. Maternal medical history is insignificant. Prenatal history significant for Di-Di twins and short cervix (on vaginal progesterone). PNL nr/immune/neg/ RPR sent. GBS unknown. Antibiotics were not given or betamethasone. SROM at 6:00 on 7/15, clear fluid (9 hours prior). Baby A born breech, emerged vigorous and crying; was w/d/s/s with APGARs of 8/9. She voided on the warmer. Mom would like to breast feed,  wants Hep B and PMD: Adame. Attending neonatologist Dr. Dahl present at the delivery.      Social History: No history of alcohol/tobacco exposure obtained  FHx: non-contributory to the condition being treated   ROS: unable to obtain ()     PHYSICAL EXAM:    General:	Awake and active;   Head:		AFOF  Eyes:		Normally set bilaterally  Ears:		Patent bilaterally, no deformities  Nose/Mouth:	Nares patent, palate intact  Neck:		No masses, intact clavicles  Chest/Lungs:      Breath sounds equal to auscultation. No retractions  CV:		No murmurs appreciated, normal pulses bilaterally  Abdomen:         Soft nontender nondistended, no masses, bowel sounds present  :		Normal for gestational age  Back:		Intact skin, no sacral dimples or tags  Anus:		Grossly patent  Extremities:	FROM, no hip clicks  Skin:		Pink, no lesions  Neuro exam:	Appropriate tone, activity    **************************************************************************************************  Age:6d    LOS:6d    Vital Signs:  T(C): 36.8 ( @ 08:00), Max: 36.9 ( @ 11:00)  HR: 160 ( @ 08:00) (132 - 160)  BP: 74/56 ( @ 08:00) (56/29 - 78/32)  RR: 48 ( @ 08:00) (34 - 59)  SpO2: 100% ( @ 08:00) (97% - 100%)    ferrous sulfate Oral Liquid - Peds 4.5 milliGRAM(s) Elemental Iron daily  multivitamin Oral Drops - Peds 1 milliLiter(s) daily      LABS:         Blood type, Baby [08-15] ABO: O  Rh; Positive DC; Negative                              19.0   14.4 )-----------( 266             [08-15 @ 16:00]                  56.1  S 46.0%  B 0%  [place] 0%  Myelo 0%  Promyelo 0%  Blasts 0%  Lymph 34.0%  Mono 19.0%  Eos 1.0%  Baso 0%  Retic 0%        143  |110  | 12     ------------------<63   Ca 9.9  Mg 2.1  Ph 5.2   [ @ 02:32]  5.0   | 20   | 0.49        143  |109  | 11     ------------------<59   Ca 9.6  Mg 1.9  Ph 4.7   [ @ 02:31]  6.3   | 20   | 0.66               Bili T/D  [ @ 05:43] - 7.9/0.3, Bili T/D  [ @ 02:40] - 11.3/0.3, Bili T/D  [ @ 02:47] - 10.2/0.2          POCT Glucose:                                          **************************************************************************************************		  DISCHARGE PLANNING (date and status):  Hep B Vacc:   2019  CCHD:	      passed   		  :	      passed  			  Hearing:     passed 2019   [place] screen:   2019  Circumcision:    not applicable   Hip US rec: breech at birth, needs hip US at 44 -46 weeks corrected age   	  Synagis: Not applicable  Other Immunizations (with dates):    		  Neurodevelop eval?	outpatient followup   CPR class done?  	  PVS at DC - yes   FE at DC - yes 	    PMD:          Name:  _Dr. Lou Adame _             Contact information:  ______________ _  Pharmacy: Name:  ______________ _              Contact information:  ______________ _    Follow-up appointments (list):  Pediatrics, [place]     Time spent on the total subsequent encounter with >50% of the visit spent on counseling and/or coordination of care: [ _ ] 15 min[ _ ] 25 min[ x ] 35 min  [ _ ] Discharge time spent >30 min   [ __ ] Car seat oximetry reviewed. cognitive limitations

## 2021-06-01 NOTE — PROGRESS NOTE ADULT - ASSESSMENT
71M with PMHx of HTN, CAD, CHF, TBI, seizure disorder, dementia, Parkinson's, psychosis was BIB EMS from the [facility] for right ankle pain and swelling s/p fall, found to have right distal tibia and fibular fracture.    #Acute right distal tibia and fibular fracture, suspect large component of polypharmacy (on multiple sedative medications including benzos) and/or dehydration  - right ankle immobilized  - pain control  - ortho following, non-surgical at this time  - NWB RLE  - weekly x-rays x 4 weeks, elevation  -PT eval rec [facility]    #Possible history of alcohol abuse  -folate, thiamine and multivitamin   -no signs of any withdrawal    #Hyponatremia - resolved  -Encourage PO intake    #Hypokalemia: repleted, stable     #CAD   #CHFpEF, CHRONIC  - Was previously on lasix 40mg BID and metolazone 2.5mg mon, wed, fri at home ... this could have contributed to the hyponatremia   - serum bicarb 38 on admission, could be contraction alkalosis; lasix decreased to once daily, metolazone on hold --> alkalosis is improved  - no signs of fluid overload at this time  - ASA added  - echo reviewed: EF 55%    #Mood disorder  #Anxiety disorder  - cont lexapro, risperdal, seroquel, mirtazepine and xanax  - these medications are chronic for the patient, xanax changed to q8h from q6h yesterday and BID today (cannot just stop the medication due to risk of withdrawal)     #Chronic back pain  - Tylenol prn   - baclofen  - lidocaine patch  - Trial heat pack today     #history of TBI and seizure disorder  - Vimpat  - Depakote   - Primidone      #Parkinson's and dementia  - sinemet     #HTN  -BP 140s-150s  -fluids stopped 5/30  - Start low dose Beta blocker    #Hepatitis C - reactive  - Spoke to patient, + known history of Hep C (does not know how he contracted it)  - Outpatient follow-up    #DVT ppx: Lovenox SQ     Dispo: D/C to [facility] when auth obtained, medically stable for dc     6/1: Case d/w patient's POA, Corinne Lingebach, [phone] - all questions answered and she agrees with the plan

## 2021-06-01 NOTE — DIETITIAN INITIAL EVALUATION ADULT. - OTHER INFO
pt. admitted w/ s/p fall w/ ankle fx. eating well; tolerates dash diet. unable to complete education due to dementia. denies any n/v/ diarrhea. last bm was 5/31. edema noted in left ankle. skin intact. weight hx unknown

## 2021-06-01 NOTE — PROGRESS NOTE ADULT - ATTENDING COMMENTS
Acute right ankle fracture: NWB, will need auth for [facility]  Hepatitis C: NEGATIVE for RNA, suspect false positive reactivity, outpatient follow-up    Stable for d/c, pending auth.

## 2021-06-02 ENCOUNTER — TRANSCRIPTION ENCOUNTER (OUTPATIENT)
Age: 71
End: 2021-06-02

## 2021-06-02 VITALS
TEMPERATURE: 98 F | SYSTOLIC BLOOD PRESSURE: 126 MMHG | HEART RATE: 64 BPM | RESPIRATION RATE: 16 BRPM | DIASTOLIC BLOOD PRESSURE: 64 MMHG | OXYGEN SATURATION: 97 %

## 2021-06-02 PROCEDURE — 86901 BLOOD TYPING SEROLOGIC RH(D): CPT

## 2021-06-02 PROCEDURE — 85025 COMPLETE CBC W/AUTO DIFF WBC: CPT

## 2021-06-02 PROCEDURE — 80307 DRUG TEST PRSMV CHEM ANLYZR: CPT

## 2021-06-02 PROCEDURE — 94640 AIRWAY INHALATION TREATMENT: CPT

## 2021-06-02 PROCEDURE — 97163 PT EVAL HIGH COMPLEX 45 MIN: CPT

## 2021-06-02 PROCEDURE — 99239 HOSP IP/OBS DSCHRG MGMT >30: CPT

## 2021-06-02 PROCEDURE — 99285 EMERGENCY DEPT VISIT HI MDM: CPT | Mod: 25

## 2021-06-02 PROCEDURE — 86803 HEPATITIS C AB TEST: CPT

## 2021-06-02 PROCEDURE — 83735 ASSAY OF MAGNESIUM: CPT

## 2021-06-02 PROCEDURE — 86900 BLOOD TYPING SEROLOGIC ABO: CPT

## 2021-06-02 PROCEDURE — 36415 COLL VENOUS BLD VENIPUNCTURE: CPT

## 2021-06-02 PROCEDURE — 93306 TTE W/DOPPLER COMPLETE: CPT

## 2021-06-02 PROCEDURE — 73700 CT LOWER EXTREMITY W/O DYE: CPT

## 2021-06-02 PROCEDURE — 87521 HEPATITIS C PROBE&RVRS TRNSC: CPT

## 2021-06-02 PROCEDURE — 97116 GAIT TRAINING THERAPY: CPT

## 2021-06-02 PROCEDURE — 84484 ASSAY OF TROPONIN QUANT: CPT

## 2021-06-02 PROCEDURE — 86769 SARS-COV-2 COVID-19 ANTIBODY: CPT

## 2021-06-02 PROCEDURE — 36000 PLACE NEEDLE IN VEIN: CPT

## 2021-06-02 PROCEDURE — 80164 ASSAY DIPROPYLACETIC ACD TOT: CPT

## 2021-06-02 PROCEDURE — 70450 CT HEAD/BRAIN W/O DYE: CPT

## 2021-06-02 PROCEDURE — 97530 THERAPEUTIC ACTIVITIES: CPT

## 2021-06-02 PROCEDURE — 80053 COMPREHEN METABOLIC PANEL: CPT

## 2021-06-02 PROCEDURE — 73610 X-RAY EXAM OF ANKLE: CPT

## 2021-06-02 PROCEDURE — 97110 THERAPEUTIC EXERCISES: CPT

## 2021-06-02 PROCEDURE — 80048 BASIC METABOLIC PNL TOTAL CA: CPT

## 2021-06-02 PROCEDURE — 84100 ASSAY OF PHOSPHORUS: CPT

## 2021-06-02 PROCEDURE — 76376 3D RENDER W/INTRP POSTPROCES: CPT

## 2021-06-02 PROCEDURE — 85610 PROTHROMBIN TIME: CPT

## 2021-06-02 PROCEDURE — 93005 ELECTROCARDIOGRAM TRACING: CPT

## 2021-06-02 PROCEDURE — U0005: CPT

## 2021-06-02 PROCEDURE — 85730 THROMBOPLASTIN TIME PARTIAL: CPT

## 2021-06-02 PROCEDURE — 86850 RBC ANTIBODY SCREEN: CPT

## 2021-06-02 PROCEDURE — U0003: CPT

## 2021-06-02 RX ORDER — GABAPENTIN 400 MG/1
1 CAPSULE ORAL
Qty: 0 | Refills: 0 | DISCHARGE
Start: 2021-06-02

## 2021-06-02 RX ORDER — ACETAMINOPHEN 500 MG
2 TABLET ORAL
Qty: 0 | Refills: 0 | DISCHARGE
Start: 2021-06-02

## 2021-06-02 RX ORDER — OXYCODONE HYDROCHLORIDE 5 MG/1
1 TABLET ORAL
Qty: 0 | Refills: 0 | DISCHARGE
Start: 2021-06-02

## 2021-06-02 RX ORDER — PRIMIDONE 250 MG/1
1 TABLET ORAL
Qty: 0 | Refills: 0 | DISCHARGE
Start: 2021-06-02

## 2021-06-02 RX ORDER — ENOXAPARIN SODIUM 100 MG/ML
40 INJECTION SUBCUTANEOUS
Qty: 0 | Refills: 0 | DISCHARGE
Start: 2021-06-02

## 2021-06-02 RX ORDER — TRAMADOL HYDROCHLORIDE 50 MG/1
1 TABLET ORAL
Qty: 0 | Refills: 0 | DISCHARGE
Start: 2021-06-02

## 2021-06-02 RX ORDER — LACOSAMIDE 50 MG/1
1 TABLET ORAL
Qty: 0 | Refills: 0 | DISCHARGE
Start: 2021-06-02

## 2021-06-02 RX ORDER — SENNA PLUS 8.6 MG/1
2 TABLET ORAL
Qty: 0 | Refills: 0 | DISCHARGE
Start: 2021-06-02

## 2021-06-02 RX ORDER — PRIMIDONE 250 MG/1
2 TABLET ORAL
Qty: 0 | Refills: 0 | DISCHARGE
Start: 2021-06-02

## 2021-06-02 RX ORDER — ASPIRIN/CALCIUM CARB/MAGNESIUM 324 MG
1 TABLET ORAL
Qty: 0 | Refills: 0 | DISCHARGE
Start: 2021-06-02

## 2021-06-02 RX ORDER — LIDOCAINE 4 G/100G
1 CREAM TOPICAL
Qty: 0 | Refills: 0 | DISCHARGE
Start: 2021-06-02

## 2021-06-02 RX ORDER — LACOSAMIDE 50 MG/1
2 TABLET ORAL
Qty: 0 | Refills: 0 | DISCHARGE
Start: 2021-06-02

## 2021-06-02 RX ORDER — METOPROLOL TARTRATE 50 MG
1 TABLET ORAL
Qty: 0 | Refills: 0 | DISCHARGE
Start: 2021-06-02

## 2021-06-02 RX ORDER — METOPROLOL TARTRATE 50 MG
0.5 TABLET ORAL
Qty: 0 | Refills: 0 | DISCHARGE
Start: 2021-06-02

## 2021-06-02 RX ORDER — DIVALPROEX SODIUM 500 MG/1
4 TABLET, DELAYED RELEASE ORAL
Qty: 0 | Refills: 0 | DISCHARGE
Start: 2021-06-02

## 2021-06-02 RX ORDER — ALPRAZOLAM 0.25 MG
1 TABLET ORAL
Qty: 0 | Refills: 0 | DISCHARGE
Start: 2021-06-02

## 2021-06-02 RX ORDER — FUROSEMIDE 40 MG
1 TABLET ORAL
Qty: 0 | Refills: 0 | DISCHARGE
Start: 2021-06-02

## 2021-06-02 RX ADMIN — CARBIDOPA AND LEVODOPA 1 TABLET(S): 25; 100 TABLET ORAL at 05:49

## 2021-06-02 RX ADMIN — ESCITALOPRAM OXALATE 20 MILLIGRAM(S): 10 TABLET, FILM COATED ORAL at 11:32

## 2021-06-02 RX ADMIN — PRIMIDONE 100 MILLIGRAM(S): 250 TABLET ORAL at 05:49

## 2021-06-02 RX ADMIN — Medication 650 MILLIGRAM(S): at 06:56

## 2021-06-02 RX ADMIN — Medication 1000 UNIT(S): at 11:31

## 2021-06-02 RX ADMIN — Medication 1 SPRAY(S): at 05:50

## 2021-06-02 RX ADMIN — Medication 0.25 MILLIGRAM(S): at 05:48

## 2021-06-02 RX ADMIN — Medication 650 MILLIGRAM(S): at 11:34

## 2021-06-02 RX ADMIN — Medication 1 MILLIGRAM(S): at 11:31

## 2021-06-02 RX ADMIN — PANTOPRAZOLE SODIUM 40 MILLIGRAM(S): 20 TABLET, DELAYED RELEASE ORAL at 05:49

## 2021-06-02 RX ADMIN — LACOSAMIDE 200 MILLIGRAM(S): 50 TABLET ORAL at 08:27

## 2021-06-02 RX ADMIN — CARBIDOPA AND LEVODOPA 1 TABLET(S): 25; 100 TABLET ORAL at 11:31

## 2021-06-02 RX ADMIN — RISPERIDONE 0.25 MILLIGRAM(S): 4 TABLET ORAL at 05:49

## 2021-06-02 RX ADMIN — Medication 12.5 MILLIGRAM(S): at 05:49

## 2021-06-02 RX ADMIN — Medication 25 MICROGRAM(S): at 05:49

## 2021-06-02 RX ADMIN — ENOXAPARIN SODIUM 40 MILLIGRAM(S): 100 INJECTION SUBCUTANEOUS at 11:32

## 2021-06-02 RX ADMIN — Medication 1 TABLET(S): at 11:31

## 2021-06-02 RX ADMIN — Medication 40 MILLIGRAM(S): at 05:49

## 2021-06-02 RX ADMIN — DIVALPROEX SODIUM 500 MILLIGRAM(S): 500 TABLET, DELAYED RELEASE ORAL at 05:49

## 2021-06-02 RX ADMIN — Medication 81 MILLIGRAM(S): at 11:30

## 2021-06-02 RX ADMIN — LIDOCAINE 1 PATCH: 4 CREAM TOPICAL at 11:32

## 2021-06-02 RX ADMIN — DIVALPROEX SODIUM 500 MILLIGRAM(S): 500 TABLET, DELAYED RELEASE ORAL at 13:49

## 2021-06-02 RX ADMIN — Medication 650 MILLIGRAM(S): at 05:50

## 2021-06-02 RX ADMIN — Medication 100 MILLIGRAM(S): at 11:31

## 2021-06-02 NOTE — DISCHARGE NOTE PROVIDER - NSDCCPCAREPLAN_GEN_ALL_CORE_FT
PRINCIPAL DISCHARGE DIAGNOSIS  Diagnosis: Closed fracture of right ankle, initial encounter  Assessment and Plan of Treatment: You fractured your right ankle. Continue to wear you splint immobilizer. You should NOT put any weight on your right leg. You will need weekly x-rays of your right ankle to make sure it is healing properly. Continue pain control as needed. Follow-up with the orthopedic surgeon next week.

## 2021-06-02 NOTE — PROGRESS NOTE ADULT - ASSESSMENT
71M with PMHx of HTN, CAD, CHF, TBI, seizure disorder, dementia, Parkinson's, psychosis was BIB EMS from the [facility] for right ankle pain and swelling s/p fall, found to have right distal tibia and fibular fracture.    #Acute right distal tibia and fibular fracture, suspect large component of polypharmacy (on multiple sedative medications including benzos) and/or dehydration  - right ankle immobilized  - pain control  - ortho following, non-surgical at this time  - NWB RLE  - weekly x-rays x 4 weeks, elevation  -PT eval rec [facility]    #Possible history of alcohol abuse  -folate, thiamine and multivitamin   -no signs of any withdrawal    #Hyponatremia - resolved  -Encourage PO intake    #Hypokalemia: repleted, stable     #CAD   #CHFpEF, CHRONIC  - Was previously on lasix 40mg BID and metolazone 2.5mg mon, wed, fri at home ... this could have contributed to the hyponatremia   - serum bicarb 38 on admission, could be contraction alkalosis; lasix decreased to once daily, metolazone on hold --> alkalosis is improved  - no signs of fluid overload at this time  - ASA added  - echo reviewed: EF 55%    #Mood disorder  #Anxiety disorder  - cont lexapro, risperdal, seroquel, mirtazepine and xanax  - these medications are chronic for the patient, xanax changed to q8h from q6h yesterday and now to BID (cannot just stop the medication due to risk of withdrawal)     #Chronic back pain  - Tylenol prn   - baclofen  - lidocaine patch    #history of TBI and seizure disorder  - Vimpat  - Depakote   - Primidone      #Parkinson's and dementia  - sinemet     #HTN  -low dose Beta blocker started     #DVT ppx: Lovenox SQ     Dispo: D/C to [facility] - saeid martínez, 2PM picked, case d/w patient's POA, Corinne Lingebach, [phone] - all questions answered and she agrees with the plan    Time spent on d/c 32 min

## 2021-06-02 NOTE — DISCHARGE NOTE PROVIDER - CARE PROVIDERS DIRECT ADDRESSES
[email or web address]

## 2021-06-02 NOTE — PROGRESS NOTE ADULT - REASON FOR ADMISSION
right ankle fracture

## 2021-06-02 NOTE — DISCHARGE NOTE PROVIDER - HOSPITAL COURSE
71M with PMHx of HTN, CAD, CHF, TBI, seizure disorder, dementia, Parkinson's, psychosis was BIB EMS from the [facility] for right ankle pain and swelling s/p fall, found to have right distal tibia and fibular fracture. Patient seen by ortho, deemed non-operative, and was placed in a right ankle splint immobilizer. Patient will need weekly X-rays x 4 weeks to evaluate the fracture, and will be NWB to the right leg at this time.     Case d/w Dr Reyes - who will be following patient at [facility]    No active infection    Patient is FULL CODE     POA: Corinne Lingebach, [phone]

## 2021-06-02 NOTE — DISCHARGE NOTE PROVIDER - NSDCMRMEDTOKEN_GEN_ALL_CORE_FT
acetaminophen 325 mg oral tablet: 2 tab(s) orally every 8 hours, As needed, Temp greater or equal to 38C (100.4F), Mild Pain (1 - 3)  ALPRAZolam 0.25 mg oral tablet: 1 tab(s) orally 2 times a day  aspirin 81 mg oral tablet, chewable: 1 tab(s) orally once a day  baclofen 10 mg oral tablet: orally once a day (at bedtime)  carbidopa-levodopa 25 mg-100 mg oral tablet: orally 4 times a day  divalproex sodium 125 mg oral delayed release capsule: 4 cap(s) orally 3 times a day  enoxaparin: 40 milligram(s) subcutaneous once a day  escitalopram 20 mg oral tablet: 1 tab(s) orally once a day  Flonase 50 mcg/inh nasal spray: 1 spray(s) nasal once a day  folic acid 1 mg oral tablet: 1 tab(s) orally once a day  furosemide 40 mg oral tablet: 1 tab(s) orally once a day  gabapentin 300 mg oral capsule: 1 cap(s) orally once a day (at bedtime)  lacosamide 150 mg oral tablet: 2 tab(s) orally  at 5 PM  lacosamide 200 mg oral tablet: 1 tab(s) orally  at  9AM  lidocaine 5% topical film: Apply topically to affected area once a day  memantine 10 mg oral tablet: orally once a day (at bedtime)  Metoprolol Tartrate 25 mg oral tablet: 0.5 tab(s) orally 2 times a day  mirtazapine 7.5 mg oral tablet: orally once a day (at bedtime)  Multiple Vitamins oral tablet: 1 tab(s) orally once a day  omeprazole 20 mg oral delayed release capsule: 1 cap(s) orally once a day  oxyCODONE 5 mg oral tablet: 1 tab(s) orally every 6 hours, As needed, Severe Pain (7 - 10)  primidone 50 mg oral tablet: 1 tab(s) orally once a day, As needed, tremor  primidone 50 mg oral tablet: 2 tab(s) orally 2 times a day  RisperDAL 0.25 mg oral tablet: 1 tab(s) orally 2 times a day  senna oral tablet: 2 tab(s) orally once a day (at bedtime)  SEROquel 25 mg oral tablet: orally once a day (at bedtime)  Synthroid 25 mcg (0.025 mg) oral tablet: 1 tab(s) orally once a day  traMADol 50 mg oral tablet: 1 tab(s) orally every 6 hours, As needed, Moderate Pain (4 - 6)  Vitamin B1 100 mg oral tablet: 1 tab(s) orally once a day  Vitamin D3 1000 intl units (25 mcg) oral tablet: orally once a day

## 2021-06-02 NOTE — DISCHARGE NOTE PROVIDER - CARE PROVIDER_API CALL
Reyes, John A (MD)  Internal Medicine  [address], [address]  [place] [address]  Phone: [phone]  Fax: [phone]  Follow Up Time:     Harley Soares)  Orthopaedic Sports Medicine; Orthopaedic Surgery  [address], [address]  [place] [address]  Phone: [phone]  Fax: [phone]  Follow Up Time:

## 2021-06-02 NOTE — PROGRESS NOTE ADULT - SUBJECTIVE AND OBJECTIVE BOX
Patient is a 71y old  Male who presents with a chief complaint of right ankle fracture (01 Jun 2021 07:59)  Complaining of lower back pain from hospital bed  Pain in leg controlled   Patient seen and examined at bedside.    ALLERGIES:  Keppra (Unknown)    MEDICATIONS  (STANDING):  acetaminophen   Tablet .. 650 milliGRAM(s) Oral every 12 hours  ALPRAZolam 0.25 milliGRAM(s) Oral two times a day  aspirin  chewable 81 milliGRAM(s) Oral daily  baclofen 10 milliGRAM(s) Oral at bedtime  carbidopa/levodopa  25/100 1 Tablet(s) Oral <User Schedule>  cholecalciferol 1000 Unit(s) Oral daily  diVALproex Sprinkle 500 milliGRAM(s) Oral three times a day  enoxaparin Injectable 40 milliGRAM(s) SubCutaneous daily  escitalopram 20 milliGRAM(s) Oral daily  fluticasone propionate 50 MICROgram(s)/spray Nasal Spray 1 Spray(s) Both Nostrils <User Schedule>  folic acid 1 milliGRAM(s) Oral daily  furosemide    Tablet 40 milliGRAM(s) Oral daily  gabapentin 300 milliGRAM(s) Oral at bedtime  lacosamide 300 milliGRAM(s) Oral <User Schedule>  lacosamide 200 milliGRAM(s) Oral <User Schedule>  levothyroxine 25 MICROGram(s) Oral daily  lidocaine   Patch 1 Patch Transdermal daily  memantine 10 milliGRAM(s) Oral at bedtime  mirtazapine 7.5 milliGRAM(s) Oral at bedtime  multivitamin 1 Tablet(s) Oral daily  pantoprazole    Tablet 40 milliGRAM(s) Oral before breakfast  primidone 100 milliGRAM(s) Oral two times a day  QUEtiapine 25 milliGRAM(s) Oral at bedtime  risperiDONE   Tablet 0.25 milliGRAM(s) Oral two times a day  senna 2 Tablet(s) Oral at bedtime  thiamine 100 milliGRAM(s) Oral daily    MEDICATIONS  (PRN):  acetaminophen   Tablet .. 650 milliGRAM(s) Oral every 8 hours PRN Temp greater or equal to 38C (100.4F), Mild Pain (1 - 3)  LORazepam     Tablet 2 milliGRAM(s) Oral every 4 hours PRN CIWA-Ar score 8 or greater  oxyCODONE    IR 5 milliGRAM(s) Oral every 6 hours PRN Severe Pain (7 - 10)  primidone 50 milliGRAM(s) Oral daily PRN tremor  traMADol 50 milliGRAM(s) Oral every 6 hours PRN Moderate Pain (4 - 6)    Vital Signs Last 24 Hrs  T(F): 97.4 (01 Jun 2021 09:00), Max: 99.2 (01 Jun 2021 05:23)  HR: 98 (01 Jun 2021 09:00) (68 - 98)  BP: 141/69 (01 Jun 2021 09:00) (137/72 - 149/90)  RR: 16 (01 Jun 2021 09:00) (16 - 18)  SpO2: 96% (01 Jun 2021 09:00) (95% - 99%)  I&O's Summary    31 May 2021 07:01  -  01 Jun 2021 07:00  --------------------------------------------------------  IN: 920 mL / OUT: 2425 mL / NET: -1505 mL    01 Jun 2021 07:01  -  01 Jun 2021 10:51  --------------------------------------------------------  IN: 960 mL / OUT: 425 mL / NET: 535 mL      BMI (kg/m2): 27.7 (05-27-21 @ 20:50)  PHYSICAL EXAM:  General: NAD, A/O x 3  ENT: MMM, no thrush  Neck: Supple, No JVD  Lungs: Non labored breathing,  Clear to auscultation bilaterally,   Cardio: RRR, S1/S2, , no pitting edema bilaterally  Abdomen: Soft, Nontender, Nondistended; Bowel sounds present  Extremities: No calf tenderness, R ankle sprint and ACE on, can wiggle toes, skin warm and sensation intact   LABS:      05-31    135  |  96  |  16  ----------------------------<  87  4.1   |  31  |  0.77    Ca    9.0      31 May 2021 06:57       eGFR if Non African American: 91 mL/min/1.73M2 (05-31-21 @ 06:57)  eGFR if : 106 mL/min/1.73M2 (05-31-21 @ 06:57)                                   COVID-19 PCR: NotDetec (05-28-21 @ 13:15)      RADIOLOGY & ADDITIONAL TESTS:    Care Discussed with Consultants/Other Providers:   
Patient is a 71y old  Male who presents with a chief complaint of right ankle fracture (27 May 2021 20:15)      Patient seen and examined at bedside. No overnight events reported. Say he has right ankle pain when he moves it.    ALLERGIES:  Keppra (Unknown)    MEDICATIONS  (STANDING):  acetaminophen   Tablet .. 650 milliGRAM(s) Oral every 12 hours  ALPRAZolam 0.25 milliGRAM(s) Oral four times a day  aspirin  chewable 81 milliGRAM(s) Oral daily  baclofen 10 milliGRAM(s) Oral at bedtime  carbidopa/levodopa  25/100 1 Tablet(s) Oral <User Schedule>  cholecalciferol 1000 Unit(s) Oral daily  diVALproex Sprinkle 500 milliGRAM(s) Oral three times a day  enoxaparin Injectable 40 milliGRAM(s) SubCutaneous daily  escitalopram 20 milliGRAM(s) Oral daily  fluticasone propionate 50 MICROgram(s)/spray Nasal Spray 1 Spray(s) Both Nostrils <User Schedule>  folic acid 1 milliGRAM(s) Oral daily  furosemide    Tablet 40 milliGRAM(s) Oral daily  gabapentin 300 milliGRAM(s) Oral at bedtime  lacosamide 300 milliGRAM(s) Oral <User Schedule>  lacosamide 200 milliGRAM(s) Oral <User Schedule>  levothyroxine 25 MICROGram(s) Oral daily  lidocaine   Patch 1 Patch Transdermal daily  memantine 10 milliGRAM(s) Oral at bedtime  metolazone 2.5 milliGRAM(s) Oral <User Schedule>  mirtazapine 7.5 milliGRAM(s) Oral at bedtime  multivitamin 1 Tablet(s) Oral daily  pantoprazole    Tablet 40 milliGRAM(s) Oral before breakfast  primidone 100 milliGRAM(s) Oral two times a day  QUEtiapine 25 milliGRAM(s) Oral at bedtime  risperiDONE   Tablet 0.25 milliGRAM(s) Oral two times a day  senna 2 Tablet(s) Oral at bedtime  sodium chloride 0.9%. 1000 milliLiter(s) (75 mL/Hr) IV Continuous <Continuous>  thiamine 100 milliGRAM(s) Oral daily    MEDICATIONS  (PRN):  acetaminophen   Tablet .. 650 milliGRAM(s) Oral every 8 hours PRN Temp greater or equal to 38C (100.4F), Mild Pain (1 - 3)  LORazepam     Tablet 2 milliGRAM(s) Oral every 4 hours PRN CIWA-Ar score 8 or greater  oxyCODONE    IR 5 milliGRAM(s) Oral every 6 hours PRN Severe Pain (7 - 10)  primidone 50 milliGRAM(s) Oral daily PRN tremor  traMADol 50 milliGRAM(s) Oral every 6 hours PRN Moderate Pain (4 - 6)    Vital Signs Last 24 Hrs  T(F): 98.1 (28 May 2021 04:34), Max: 98.1 (28 May 2021 04:34)  HR: 60 (28 May 2021 04:34) (57 - 60)  BP: 142/66 (28 May 2021 04:34) (134/81 - 162/89)  RR: 16 (28 May 2021 04:34) (16 - 18)  SpO2: 94% (28 May 2021 04:34) (94% - 100%)  I&O's Summary    27 May 2021 07:01  -  28 May 2021 07:00  --------------------------------------------------------  IN: 825 mL / OUT: 1000 mL / NET: -175 mL    28 May 2021 07:01  -  28 May 2021 16:13  --------------------------------------------------------  IN: 1310 mL / OUT: 400 mL / NET: 910 mL      PHYSICAL EXAM:  General: NAD, A/O x 3  ENT: Moist mucous membranes, no thrush  Neck: Supple, No JVD  Lungs: Clear to auscultation bilaterally, good air entry, non-labored breathing  Cardio: RRR, S1/S2, No murmur  Abdomen: Soft, Nontender, Nondistended; Bowel sounds present  Extremities: No calf tenderness, No pitting edema, bilateral DP present    LABS:                        12.0   6.59  )-----------( 202      ( 27 May 2021 13:20 )             35.9     05-28    131  |  91  |  18  ----------------------------<  98  3.2   |  35  |  0.86    Ca    8.3      28 May 2021 06:43  Phos  3.0     05-28  Mg     2.1     05-28    TPro  7.6  /  Alb  3.5  /  TBili  0.6  /  DBili  x   /  AST  25  /  ALT  10  /  AlkPhos  63  05-27        eGFR if Non African American: 87 mL/min/1.73M2 (05-28-21 @ 06:43)    PT/INR - ( 27 May 2021 18:00 )   PT: 11.8 sec;   INR: 0.97 ratio         PTT - ( 27 May 2021 18:00 )  PTT:31.6 sec      CARDIAC MARKERS ( 27 May 2021 13:20 )  <.017 ng/mL / x     / x     / x     / x        
Patient is a 71y old  Male who presents with a chief complaint of right ankle fracture (28 May 2021 16:44)      Patient seen and examined at bedside. No overnight events reported.     ALLERGIES:  Keppra (Unknown)    MEDICATIONS  (STANDING):  acetaminophen   Tablet .. 650 milliGRAM(s) Oral every 12 hours  ALPRAZolam 0.25 milliGRAM(s) Oral three times a day  aspirin  chewable 81 milliGRAM(s) Oral daily  baclofen 10 milliGRAM(s) Oral at bedtime  carbidopa/levodopa  25/100 1 Tablet(s) Oral <User Schedule>  cholecalciferol 1000 Unit(s) Oral daily  diVALproex Sprinkle 500 milliGRAM(s) Oral three times a day  enoxaparin Injectable 40 milliGRAM(s) SubCutaneous daily  escitalopram 20 milliGRAM(s) Oral daily  fluticasone propionate 50 MICROgram(s)/spray Nasal Spray 1 Spray(s) Both Nostrils <User Schedule>  folic acid 1 milliGRAM(s) Oral daily  furosemide    Tablet 40 milliGRAM(s) Oral daily  gabapentin 300 milliGRAM(s) Oral at bedtime  lacosamide 300 milliGRAM(s) Oral <User Schedule>  lacosamide 200 milliGRAM(s) Oral <User Schedule>  levothyroxine 25 MICROGram(s) Oral daily  lidocaine   Patch 1 Patch Transdermal daily  memantine 10 milliGRAM(s) Oral at bedtime  mirtazapine 7.5 milliGRAM(s) Oral at bedtime  multivitamin 1 Tablet(s) Oral daily  pantoprazole    Tablet 40 milliGRAM(s) Oral before breakfast  primidone 100 milliGRAM(s) Oral two times a day  QUEtiapine 25 milliGRAM(s) Oral at bedtime  risperiDONE   Tablet 0.25 milliGRAM(s) Oral two times a day  senna 2 Tablet(s) Oral at bedtime  sodium chloride 0.9%. 1000 milliLiter(s) (75 mL/Hr) IV Continuous <Continuous>  thiamine 100 milliGRAM(s) Oral daily    MEDICATIONS  (PRN):  acetaminophen   Tablet .. 650 milliGRAM(s) Oral every 8 hours PRN Temp greater or equal to 38C (100.4F), Mild Pain (1 - 3)  LORazepam     Tablet 2 milliGRAM(s) Oral every 4 hours PRN CIWA-Ar score 8 or greater  oxyCODONE    IR 5 milliGRAM(s) Oral every 6 hours PRN Severe Pain (7 - 10)  primidone 50 milliGRAM(s) Oral daily PRN tremor  traMADol 50 milliGRAM(s) Oral every 6 hours PRN Moderate Pain (4 - 6)    Vital Signs Last 24 Hrs  T(F): 97.4 (29 May 2021 15:32), Max: 97.9 (28 May 2021 20:00)  HR: 65 (29 May 2021 15:32) (65 - 69)  BP: 135/72 (29 May 2021 15:32) (135/72 - 160/83)  RR: 16 (29 May 2021 15:32) (15 - 16)  SpO2: 98% (29 May 2021 15:32) (98% - 98%)  I&O's Summary    28 May 2021 07:01  -  29 May 2021 07:00  --------------------------------------------------------  IN: 1790 mL / OUT: 1800 mL / NET: -10 mL    29 May 2021 07:01  -  29 May 2021 16:28  --------------------------------------------------------  IN: 0 mL / OUT: 925 mL / NET: -925 mL      PHYSICAL EXAM:  General: NAD, A/O x 3  ENT: Moist mucous membranes, no thrush  Neck: Supple, No JVD  Lungs: Clear to auscultation bilaterally, good air entry, non-labored breathing  Cardio: RRR, S1/S2, No murmur  Abdomen: Soft, Nontender, Nondistended; Bowel sounds present  Extremities: No calf tenderness, No pitting edema, right ankle immobilizer in place    LABS:                        11.6   5.86  )-----------( 210      ( 29 May 2021 05:45 )             35.3     05-29    133  |  94  |  23  ----------------------------<  92  4.0   |  34  |  0.78    Ca    8.8      29 May 2021 05:45  Phos  3.0     05-28  Mg     2.1     05-28    TPro  7.6  /  Alb  3.5  /  TBili  0.6  /  DBili  x   /  AST  25  /  ALT  10  /  AlkPhos  63  05-27        eGFR if Non African American: 91 mL/min/1.73M2 (05-29-21 @ 05:45)    PT/INR - ( 27 May 2021 18:00 )   PT: 11.8 sec;   INR: 0.97 ratio         PTT - ( 27 May 2021 18:00 )  PTT:31.6 sec      CARDIAC MARKERS ( 27 May 2021 13:20 )  <.017 ng/mL / x     / x     / x     / x                                COVID-19 PCR: NotDetec (05-28-21 @ 13:15)    RADIOLOGY & ADDITIONAL TESTS:    Care Discussed with Consultants/Other Providers:   
Patient is a 71y old  Male who presents with a chief complaint of right ankle fracture (29 May 2021 16:28)    Patient seen and examined at bedside. No overnight events reported.     ALLERGIES:  Keppra (Unknown)    MEDICATIONS  (STANDING):  acetaminophen   Tablet .. 650 milliGRAM(s) Oral every 12 hours  ALPRAZolam 0.25 milliGRAM(s) Oral three times a day  aspirin  chewable 81 milliGRAM(s) Oral daily  baclofen 10 milliGRAM(s) Oral at bedtime  carbidopa/levodopa  25/100 1 Tablet(s) Oral <User Schedule>  cholecalciferol 1000 Unit(s) Oral daily  diVALproex Sprinkle 500 milliGRAM(s) Oral three times a day  enoxaparin Injectable 40 milliGRAM(s) SubCutaneous daily  escitalopram 20 milliGRAM(s) Oral daily  fluticasone propionate 50 MICROgram(s)/spray Nasal Spray 1 Spray(s) Both Nostrils <User Schedule>  folic acid 1 milliGRAM(s) Oral daily  furosemide    Tablet 40 milliGRAM(s) Oral daily  gabapentin 300 milliGRAM(s) Oral at bedtime  lacosamide 300 milliGRAM(s) Oral <User Schedule>  lacosamide 200 milliGRAM(s) Oral <User Schedule>  levothyroxine 25 MICROGram(s) Oral daily  lidocaine   Patch 1 Patch Transdermal daily  memantine 10 milliGRAM(s) Oral at bedtime  mirtazapine 7.5 milliGRAM(s) Oral at bedtime  multivitamin 1 Tablet(s) Oral daily  pantoprazole    Tablet 40 milliGRAM(s) Oral before breakfast  primidone 100 milliGRAM(s) Oral two times a day  QUEtiapine 25 milliGRAM(s) Oral at bedtime  risperiDONE   Tablet 0.25 milliGRAM(s) Oral two times a day  senna 2 Tablet(s) Oral at bedtime  sodium chloride 0.9%. 1000 milliLiter(s) (75 mL/Hr) IV Continuous <Continuous>  thiamine 100 milliGRAM(s) Oral daily    MEDICATIONS  (PRN):  acetaminophen   Tablet .. 650 milliGRAM(s) Oral every 8 hours PRN Temp greater or equal to 38C (100.4F), Mild Pain (1 - 3)  LORazepam     Tablet 2 milliGRAM(s) Oral every 4 hours PRN CIWA-Ar score 8 or greater  oxyCODONE    IR 5 milliGRAM(s) Oral every 6 hours PRN Severe Pain (7 - 10)  primidone 50 milliGRAM(s) Oral daily PRN tremor  traMADol 50 milliGRAM(s) Oral every 6 hours PRN Moderate Pain (4 - 6)    Vital Signs Last 24 Hrs  T(F): 97.6 (30 May 2021 06:00), Max: 97.6 (30 May 2021 06:00)  HR: 63 (30 May 2021 06:00) (63 - 69)  BP: 157/82 (30 May 2021 06:00) (135/72 - 160/85)  RR: 18 (30 May 2021 06:00) (16 - 18)  SpO2: 98% (30 May 2021 06:00) (98% - 100%)  I&O's Summary    29 May 2021 07:01  -  30 May 2021 07:00  --------------------------------------------------------  IN: 675 mL / OUT: 1325 mL / NET: -650 mL      PHYSICAL EXAM:  General: NAD, A/O x 3  ENT: Moist mucous membranes, no thrush  Neck: Supple, No JVD  Lungs: Clear to auscultation bilaterally, good air entry, non-labored breathing  Cardio: RRR, S1/S2, No murmur  Abdomen: Soft, Nontender, Nondistended; Bowel sounds present  Extremities: No calf tenderness, No pitting edema, right ankle splint/ACE wrapped with warm skin, +DP and moves toes    LABS:                        11.6   5.86  )-----------( 210      ( 29 May 2021 05:45 )             35.3     05-30    132  |  95  |  18  ----------------------------<  88  4.1   |  30  |  0.78    Ca    8.7      30 May 2021 06:00  Phos  3.0     05-28  Mg     2.1     05-28    TPro  7.6  /  Alb  3.5  /  TBili  0.6  /  DBili  x   /  AST  25  /  ALT  10  /  AlkPhos  63  05-27        eGFR if Non African American: 91 mL/min/1.73M2 (05-30-21 @ 06:00)    PT/INR - ( 27 May 2021 18:00 )   PT: 11.8 sec;   INR: 0.97 ratio         PTT - ( 27 May 2021 18:00 )  PTT:31.6 sec      CARDIAC MARKERS ( 27 May 2021 13:20 )  <.017 ng/mL / x     / x     / x     / x          COVID-19 PCR: NotDetec (05-28-21 @ 13:15)    
Patient is a 71y old  Male who presents with a chief complaint of right ankle fracture (30 May 2021 08:44)    Patient seen and examined at bedside. No overnight events reported.     ALLERGIES:  Keppra (Unknown)    MEDICATIONS  (STANDING):  acetaminophen   Tablet .. 650 milliGRAM(s) Oral every 12 hours  ALPRAZolam 0.25 milliGRAM(s) Oral three times a day  aspirin  chewable 81 milliGRAM(s) Oral daily  baclofen 10 milliGRAM(s) Oral at bedtime  carbidopa/levodopa  25/100 1 Tablet(s) Oral <User Schedule>  cholecalciferol 1000 Unit(s) Oral daily  diVALproex Sprinkle 500 milliGRAM(s) Oral three times a day  enoxaparin Injectable 40 milliGRAM(s) SubCutaneous daily  escitalopram 20 milliGRAM(s) Oral daily  fluticasone propionate 50 MICROgram(s)/spray Nasal Spray 1 Spray(s) Both Nostrils <User Schedule>  folic acid 1 milliGRAM(s) Oral daily  furosemide    Tablet 40 milliGRAM(s) Oral daily  gabapentin 300 milliGRAM(s) Oral at bedtime  lacosamide 300 milliGRAM(s) Oral <User Schedule>  lacosamide 200 milliGRAM(s) Oral <User Schedule>  levothyroxine 25 MICROGram(s) Oral daily  lidocaine   Patch 1 Patch Transdermal daily  memantine 10 milliGRAM(s) Oral at bedtime  mirtazapine 7.5 milliGRAM(s) Oral at bedtime  multivitamin 1 Tablet(s) Oral daily  pantoprazole    Tablet 40 milliGRAM(s) Oral before breakfast  primidone 100 milliGRAM(s) Oral two times a day  QUEtiapine 25 milliGRAM(s) Oral at bedtime  risperiDONE   Tablet 0.25 milliGRAM(s) Oral two times a day  senna 2 Tablet(s) Oral at bedtime  thiamine 100 milliGRAM(s) Oral daily    MEDICATIONS  (PRN):  acetaminophen   Tablet .. 650 milliGRAM(s) Oral every 8 hours PRN Temp greater or equal to 38C (100.4F), Mild Pain (1 - 3)  LORazepam     Tablet 2 milliGRAM(s) Oral every 4 hours PRN CIWA-Ar score 8 or greater  oxyCODONE    IR 5 milliGRAM(s) Oral every 6 hours PRN Severe Pain (7 - 10)  primidone 50 milliGRAM(s) Oral daily PRN tremor  traMADol 50 milliGRAM(s) Oral every 6 hours PRN Moderate Pain (4 - 6)    Vital Signs Last 24 Hrs  T(F): 98.1 (30 May 2021 22:00), Max: 98.1 (30 May 2021 22:00)  HR: 69 (30 May 2021 22:00) (68 - 69)  BP: 158/86 (30 May 2021 22:00) (144/73 - 158/86)  RR: 16 (30 May 2021 22:00) (15 - 16)  SpO2: 98% (30 May 2021 22:00) (98% - 98%)  I&O's Summary    30 May 2021 07:01  -  31 May 2021 07:00  --------------------------------------------------------  IN: 1515 mL / OUT: 2551 mL / NET: -1036 mL      PHYSICAL EXAM:  General: NAD, A/O x 3  ENT: Moist mucous membranes, no thrush  Neck: Supple, No JVD  Lungs: Clear to auscultation bilaterally, good air entry, non-labored breathing  Cardio: RRR, S1/S2, No murmur  Abdomen: Soft, Nontender, Nondistended; Bowel sounds present  Extremities: No calf tenderness, No pitting edema, right ankle splint/ACE wrapped with warm skin, +DP and moves toes      LABS:                        11.6   5.86  )-----------( 210      ( 29 May 2021 05:45 )             35.3     05-31    135  |  96  |  16  ----------------------------<  87  4.1   |  31  |  0.77    Ca    9.0      31 May 2021 06:57          eGFR if Non African American: 91 mL/min/1.73M2 (05-31-21 @ 06:57)    COVID-19 PCR: Vivek (05-28-21 @ 13:15)    
Patient is a 71y old  Male who presents with a chief complaint of right ankle fracture (01 Jun 2021 10:42)    Patient seen and examined at bedside. No overnight events reported.     ALLERGIES:  Keppra (Unknown)    MEDICATIONS  (STANDING):  acetaminophen   Tablet .. 650 milliGRAM(s) Oral every 12 hours  ALPRAZolam 0.25 milliGRAM(s) Oral two times a day  aspirin  chewable 81 milliGRAM(s) Oral daily  baclofen 10 milliGRAM(s) Oral at bedtime  carbidopa/levodopa  25/100 1 Tablet(s) Oral <User Schedule>  cholecalciferol 1000 Unit(s) Oral daily  diVALproex Sprinkle 500 milliGRAM(s) Oral three times a day  enoxaparin Injectable 40 milliGRAM(s) SubCutaneous daily  escitalopram 20 milliGRAM(s) Oral daily  fluticasone propionate 50 MICROgram(s)/spray Nasal Spray 1 Spray(s) Both Nostrils <User Schedule>  folic acid 1 milliGRAM(s) Oral daily  furosemide    Tablet 40 milliGRAM(s) Oral daily  gabapentin 300 milliGRAM(s) Oral at bedtime  lacosamide 300 milliGRAM(s) Oral <User Schedule>  lacosamide 200 milliGRAM(s) Oral <User Schedule>  levothyroxine 25 MICROGram(s) Oral daily  lidocaine   Patch 1 Patch Transdermal daily  memantine 10 milliGRAM(s) Oral at bedtime  metoprolol tartrate 12.5 milliGRAM(s) Oral two times a day  mirtazapine 7.5 milliGRAM(s) Oral at bedtime  multivitamin 1 Tablet(s) Oral daily  pantoprazole    Tablet 40 milliGRAM(s) Oral before breakfast  primidone 100 milliGRAM(s) Oral two times a day  QUEtiapine 25 milliGRAM(s) Oral at bedtime  risperiDONE   Tablet 0.25 milliGRAM(s) Oral two times a day  senna 2 Tablet(s) Oral at bedtime  thiamine 100 milliGRAM(s) Oral daily    MEDICATIONS  (PRN):  acetaminophen   Tablet .. 650 milliGRAM(s) Oral every 8 hours PRN Temp greater or equal to 38C (100.4F), Mild Pain (1 - 3)  LORazepam     Tablet 2 milliGRAM(s) Oral every 4 hours PRN CIWA-Ar score 8 or greater  oxyCODONE    IR 5 milliGRAM(s) Oral every 6 hours PRN Severe Pain (7 - 10)  primidone 50 milliGRAM(s) Oral daily PRN tremor  traMADol 50 milliGRAM(s) Oral every 6 hours PRN Moderate Pain (4 - 6)    Vital Signs Last 24 Hrs  T(F): 97.8 (02 Jun 2021 05:20), Max: 98.1 (01 Jun 2021 20:36)  HR: 71 (02 Jun 2021 05:20) (70 - 77)  BP: 157/78 (02 Jun 2021 05:20) (129/69 - 157/78)  RR: 16 (02 Jun 2021 05:20) (15 - 16)  SpO2: 98% (02 Jun 2021 05:20) (96% - 98%)  I&O's Summary    01 Jun 2021 07:01  -  02 Jun 2021 07:00  --------------------------------------------------------  IN: 1580 mL / OUT: 1325 mL / NET: 255 mL    02 Jun 2021 07:01  -  02 Jun 2021 11:41  --------------------------------------------------------  IN: 820 mL / OUT: 900 mL / NET: -80 mL      PHYSICAL EXAM:  General: NAD, A/O x 3  ENT: Moist mucous membranes, no thrush  Neck: Supple, No JVD  Lungs: Clear to auscultation bilaterally, good air entry, non-labored breathing  Cardio: RRR, S1/S2, No murmur  Abdomen: Soft, Nontender, Nondistended; Bowel sounds present  Extremities: No calf tenderness, No pitting edema, right ankle immobilizer in place, + DP, warm skin, moves toes    LABS:    05-31    135  |  96  |  16  ----------------------------<  87  4.1   |  31  |  0.77    Ca    9.0      31 May 2021 06:57          eGFR if Non African American: 91 mL/min/1.73M2 (05-31-21 @ 06:57)            COVID-19 PCR: NotDetenicolasa (05-28-21 @ 13:15)  
Patient is a 71y old  Male who presents with a chief complaint of right ankle fracture (28 May 2021 16:13)  This patient was found OOB in chair.  He states that he is not in any severe pain at this time.  He denies any CP, SOB, no h/a, no blurry vision.  No complaints at this time.      Patient seen and examined at bedside.    ALLERGIES:  Keppra (Unknown)    MEDICATIONS  (STANDING):  acetaminophen   Tablet .. 650 milliGRAM(s) Oral every 12 hours  ALPRAZolam 0.25 milliGRAM(s) Oral three times a day  aspirin  chewable 81 milliGRAM(s) Oral daily  baclofen 10 milliGRAM(s) Oral at bedtime  carbidopa/levodopa  25/100 1 Tablet(s) Oral <User Schedule>  cholecalciferol 1000 Unit(s) Oral daily  diVALproex Sprinkle 500 milliGRAM(s) Oral three times a day  enoxaparin Injectable 40 milliGRAM(s) SubCutaneous daily  escitalopram 20 milliGRAM(s) Oral daily  fluticasone propionate 50 MICROgram(s)/spray Nasal Spray 1 Spray(s) Both Nostrils <User Schedule>  folic acid 1 milliGRAM(s) Oral daily  furosemide    Tablet 40 milliGRAM(s) Oral daily  gabapentin 300 milliGRAM(s) Oral at bedtime  lacosamide 300 milliGRAM(s) Oral <User Schedule>  lacosamide 200 milliGRAM(s) Oral <User Schedule>  levothyroxine 25 MICROGram(s) Oral daily  lidocaine   Patch 1 Patch Transdermal daily  memantine 10 milliGRAM(s) Oral at bedtime  mirtazapine 7.5 milliGRAM(s) Oral at bedtime  multivitamin 1 Tablet(s) Oral daily  pantoprazole    Tablet 40 milliGRAM(s) Oral before breakfast  primidone 100 milliGRAM(s) Oral two times a day  QUEtiapine 25 milliGRAM(s) Oral at bedtime  risperiDONE   Tablet 0.25 milliGRAM(s) Oral two times a day  senna 2 Tablet(s) Oral at bedtime  sodium chloride 0.9%. 1000 milliLiter(s) (75 mL/Hr) IV Continuous <Continuous>  thiamine 100 milliGRAM(s) Oral daily    MEDICATIONS  (PRN):  acetaminophen   Tablet .. 650 milliGRAM(s) Oral every 8 hours PRN Temp greater or equal to 38C (100.4F), Mild Pain (1 - 3)  LORazepam     Tablet 2 milliGRAM(s) Oral every 4 hours PRN CIWA-Ar score 8 or greater  oxyCODONE    IR 5 milliGRAM(s) Oral every 6 hours PRN Severe Pain (7 - 10)  primidone 50 milliGRAM(s) Oral daily PRN tremor  traMADol 50 milliGRAM(s) Oral every 6 hours PRN Moderate Pain (4 - 6)    Vital Signs Last 24 Hrs  T(F): 97.6 (28 May 2021 16:24), Max: 98.1 (28 May 2021 04:34)  HR: 62 (28 May 2021 16:24) (57 - 62)  BP: 132/59 (28 May 2021 16:24) (132/59 - 162/89)  RR: 16 (28 May 2021 16:24) (16 - 18)  SpO2: 97% (28 May 2021 16:24) (94% - 100%)  I&O's Summary    27 May 2021 07:01  -  28 May 2021 07:00  --------------------------------------------------------  IN: 825 mL / OUT: 1000 mL / NET: -175 mL    28 May 2021 07:01  -  28 May 2021 16:44  --------------------------------------------------------  IN: 1310 mL / OUT: 400 mL / NET: 910 mL      Physical Exam :   RLE   -   Right leg with splint in place, C/D/I,mild swelling and mild discomfort to palpation of Right  ankle and foot.   -   Distal Neurvascular status intact grossly.   -   Warm well perfused; capillary refill <3 seconds   -   (+)EHL/FHL 5/5  -   (+) Sensation to light touch  - patient can wiggle toes   LLE  atraumatic , pulses strong             LABS:                        12.0   6.59  )-----------( 202      ( 27 May 2021 13:20 )             35.9     05-28    131  |  91  |  18  ----------------------------<  98  3.2   |  35  |  0.86    Ca    8.3      28 May 2021 06:43  Phos  3.0     05-28  Mg     2.1     05-28    TPro  7.6  /  Alb  3.5  /  TBili  0.6  /  DBili  x   /  AST  25  /  ALT  10  /  AlkPhos  63  05-27    eGFR if Non African American: 87 mL/min/1.73M2 (05-28-21 @ 06:43)  eGFR if African American: 101 mL/min/1.73M2 (05-28-21 @ 06:43)    PT/INR - ( 27 May 2021 18:00 )   PT: 11.8 sec;   INR: 0.97 ratio         PTT - ( 27 May 2021 18:00 )  PTT:31.6 sec    CARDIAC MARKERS ( 27 May 2021 13:20 )  <.017 ng/mL / x     / x     / x     / x                                    RADIOLOGY & ADDITIONAL TESTS:  EXAM:  XR ANKLE COMP MIN 3 VIEWS RT      PROCEDURE DATE:  05/27/2021        INTERPRETATION:  Right ankle. Patient fell with local trauma. 3 views.    There is fairly significant ankle edema.    There is mild degeneration of the malleolar tips. There are small posterior and inferior calcaneal spurs. No bone destruction or fracture.    It should be noted the CAT scan which followed showed coronally oriented fractures of the distal tibia and fibula.    IMPRESSION: Fractures seen on CAT scan. Extensive edema.              MARIAM WERNER M.D., ATTENDING RADIOLOGIST  This document has been electronically signed. May 27 2021  4:59PM        EXAM:  CT ANKLE ONLY RT    [facility]      PROCEDURE DATE:  05/27/2021        INTERPRETATION:  CT ANKLE RIGHT, CT 3D RECONSTRUCTION WO WORKSTATION dated 5/27/2021 3:57 PM    INDICATION: Fall with ankle pain.    COMPARISON: Ankle radiographs dated 5/27/2021    TECHNIQUE: CT imaging of the right ankle through forefoot was performed. The data was reformatted in the axial, coronal, and sagittal planes. Additionally, 3-D reformatted imaging was created.    FINDINGS:    OSSEOUS STRUCTURES: There is a coronally oriented fracture line traversing the distal tibia and fibula in similar fashion with intra-articular extension. There is a small fracture gap at the articular surface measuring up to 0.3 cm at both the tibia and fibula. No additional fracture is appreciated.  SYNOVIUM/ JOINT FLUID: No joint effusion  TENDONS: Tendons are intact. No full-thickness tendon tear or retraction  MUSCLES: Preserved musculature.  NEUROVASCULAR STRUCTURES: Severe vascular calcifications.  SUBCUTANEOUS SOFT TISSUES: Large circumferential ankle soft tissue swelling.    3-D reformatted imaging confirms these findings.    IMPRESSION:    Intra-articular coronally oriented fracture of the distal tibia and fibula                Care Discussed with Consultants/Other Providers:

## 2021-06-11 ENCOUNTER — APPOINTMENT (OUTPATIENT)
Dept: ORTHOPEDIC SURGERY | Facility: CLINIC | Age: 71
End: 2021-06-11
Payer: MEDICARE

## 2021-06-11 PROCEDURE — 29405 APPL SHORT LEG CAST: CPT | Mod: RT

## 2021-06-11 PROCEDURE — 99204 OFFICE O/P NEW MOD 45 MIN: CPT | Mod: 25

## 2021-06-11 PROCEDURE — 99214 OFFICE O/P EST MOD 30 MIN: CPT | Mod: 25

## 2021-06-11 PROCEDURE — 99072 ADDL SUPL MATRL&STAF TM PHE: CPT

## 2021-06-11 PROCEDURE — 73610 X-RAY EXAM OF ANKLE: CPT | Mod: RT

## 2021-06-13 NOTE — HISTORY OF PRESENT ILLNESS
[de-identified] : YAHIR QUINTANA is a 71 year male presenting to the office complaining of right ankle pain. He   presents to the office ambulating with a wheelchair, immobilized in a short leg splint. He presents today with an aide for assistance as patient is a resident at the [facility].   Patient reports pain began on 05/26/2021. He reports his legs felt weak, gave up\par on him and he fell injuring his right ankle. Patient went to [facility] ED at this time where he had xrays and a CT of the right ankle revealing a distal tibia and fibula fracture.  Patient notes mildly decreased pain since onset. The patient describes the pain as a dull aching, and occasionally sharp pain diffusely located to the ankle that is intermittent in nature.  symptoms are exacerbated with any movement of and any weight bearing on the leg.  Pain is alleviated with rest.  Patient reports instability and weakness.  Of note PMHx of HTN, CAD, TBI, seizure disorder, dementia, Parkinson's, psychosis\par Patient denies any other complaints at this time.

## 2021-06-13 NOTE — PHYSICAL EXAM
[de-identified] : Right Lower Extremity\par o Ankle :\par ¦ Inspection/Palpation : diffuse distal tibia and fibula tenderness to palpation, moderate diffuse swelling, no deformities\par ¦ Range of Motion : not assessed today due to fracture \par ¦ Stability : no joint instability on provocative testing\par ¦ Strength :  not assessed today due to fracture \par o Muscle Bulk : no atrophy\par o Sensation : sensation intact to light touch\par o Skin : no skin lesions, no discoloration\par o Vascular Exam : no edema, no cyanosis, posterior tibialis and dorsalis pedis pulses normal \par \par o Foot:\par ¦ Inspection/Palpation : no tenderness to palpation, no swelling\par ¦ Range of Motion : arc of motion full and painless in all planes\par ¦ Stability : no joint instability on provocative testing\par ¦ Strength : all muscles 5/5\par \par \par \par Left Lower Extremity\par o Ankle :\par ¦ Inspection/Palpation : no tenderness to palpation, no swelling, no deformities\par ¦ Range of Motion : arc of motion full and painless in all planes\par ¦ Stability : no joint instability on provocative testing\par ¦ Strength : all muscles 5/5\par o Muscle Bulk : no atrophy\par o Sensation : sensation intact to light touch\par o Skin : no skin lesions, no discoloration\par o Vascular Exam : no edema, no cyanosis,  posterior tibialis and dorsalis pedis pulses normal \par \par   [de-identified] : o Right  Ankle : AP, lateral and oblique views were obtained, there are no soft tissue abnormalities,  alignment is normal, normal appearing joint spaces, non displaced distal tibial and fibular fractures, normal bone density, no bony lesions.\par  \par \par Patient comes to today's visit with outside imaging already performed. I reviewed the images in detail with the patient and discussed the findings as highlighted below.\par \par o Xray of the right ankle performed on 05/27/2021 at [facility]: Impression: \par ¦ There is fairly significant ankle edema.\par ¦ There is mild degeneration of the malleolar tips. There are small posterior and inferior calcaneal spurs. No bone destruction or fracture.\par ¦ It should be noted the CAT scan which followed showed coronally oriented fractures of the distal tibia and fibula.\par \par \par o  CT of the right ankle performed on 05/27/2021 at [facility]: Impression: \par ¦ Intra-articular coronally oriented fracture of the distal tibia and fibula\par \par \par

## 2021-06-13 NOTE — DISCUSSION/SUMMARY
[de-identified] : The underlying pathophysiology was reviewed in great detail with the patient as well as the various treatment options, including ice, analgesics, NSAIDs, Physical therapy,  immobilization\par \par short leg splint was removed, and a short leg cast was applied. Discussed strict NWB status. \par \par Activity modifications and restrictions were discussed. \par \par Paperwork for facility was completed today. \par \par FU 2 weeks for xrays in the cast. \par \par All questions were answered, all alternatives discussed and the patient is in complete agreement with that plan. Follow-up appointment as instructed. Any issues and the patient will call or come in sooner.

## 2021-06-13 NOTE — PROCEDURE
[de-identified] : A short leg cast was applied in clinic today. Neurovascular status was assessed pre and post placement. Patient tolerated the cast placement well, with no complaints.

## 2021-06-15 PROBLEM — Z00.00 ENCOUNTER FOR PREVENTIVE HEALTH EXAMINATION: Noted: 2021-06-15

## 2021-06-28 ENCOUNTER — APPOINTMENT (OUTPATIENT)
Dept: ORTHOPEDIC SURGERY | Facility: CLINIC | Age: 71
End: 2021-06-28
Payer: MEDICARE

## 2021-06-28 PROCEDURE — 99213 OFFICE O/P EST LOW 20 MIN: CPT

## 2021-06-28 PROCEDURE — 73610 X-RAY EXAM OF ANKLE: CPT | Mod: RT

## 2021-06-28 PROCEDURE — 99072 ADDL SUPL MATRL&STAF TM PHE: CPT

## 2021-06-28 NOTE — DISCUSSION/SUMMARY
[de-identified] : The underlying pathophysiology was reviewed in great detail with the patient as well as the various treatment options, including ice, analgesics, NSAIDs, Physical therapy,  immobilization\par \par Remain in cast. Discussed strict NWB status. \par \par Activity modifications and restrictions were discussed. \par \par Paperwork for facility was completed today. \par \par FU 2 weeks for xrays out of the cast. \par \par All questions were answered, all alternatives discussed and the patient is in complete agreement with that plan. Follow-up appointment as instructed. Any issues and the patient will call or come in sooner.

## 2021-06-28 NOTE — PHYSICAL EXAM
[de-identified] : Right Lower Extremity\par o Ankle :\par ¦ Inspection/Palpation : not assessed today due to fracture and cast in place. cast clean, dry and in tact. \par ¦ Range of Motion : not assessed today due to fracture and cast in place. \par ¦ Stability : no joint instability on provocative testing\par ¦ Strength :  not assessed today due to fracture \par o Muscle Bulk : no atrophy\par o Sensation : sensation intact to light touch\par o Skin : no skin lesions, no discoloration\par o Vascular Exam : no edema, no cyanosis, posterior tibialis and dorsalis pedis pulses normal \par \par o Foot:\par ¦ Inspection/Palpation : no tenderness to palpation, no swelling\par ¦ Range of Motion : arc of motion full and painless in all planes\par ¦ Stability : no joint instability on provocative testing\par ¦ Strength : all muscles 5/5\par \par \par \par Left Lower Extremity\par o Ankle :\par ¦ Inspection/Palpation : no tenderness to palpation, no swelling, no deformities\par ¦ Range of Motion : arc of motion full and painless in all planes\par ¦ Stability : no joint instability on provocative testing\par ¦ Strength : all muscles 5/5\par o Muscle Bulk : no atrophy\par o Sensation : sensation intact to light touch\par o Skin : no skin lesions, no discoloration\par o Vascular Exam : no edema, no cyanosis,  posterior tibialis and dorsalis pedis pulses normal \par \par   [de-identified] : o Right  Ankle : CAST IN PLACE: AP, lateral and oblique views were obtained, there are no soft tissue abnormalities,  alignment is normal, normal appearing joint spaces, non displaced distal tibial and fibular fractures, unchanged compared to previous films,  normal bone density, no bony lesions.\par  \par ----------------------------------------------------------------------------------------------------------------------------------------------------------------------------------- \par \par Patient comes to today's visit with outside imaging already performed. I reviewed the images in detail with the patient and discussed the findings as highlighted below.\par \par o  CT of the right ankle performed on 05/27/2021 at [facility]: Impression: \par ¦ Intra-articular coronally oriented fracture of the distal tibia and fibula\par \par \par

## 2021-06-28 NOTE — HISTORY OF PRESENT ILLNESS
[de-identified] : YAHIR QUINTANA is a 71 year male presenting to the office complaining of right ankle pain. He   presents to the office ambulating with a wheelchair, immobilized in a short leg splint. He presents today with an aide for assistance as patient is a resident at the [facility].   Patient reports pain began on 05/26/2021. He reports his legs felt weak, gave up\par on him and he fell injuring his right ankle. Patient went to [facility] ED at this time where he had xrays and a CT of the right ankle revealing a distal tibia and fibula fracture.  Patient notes mildly decreased pain since onset. The patient describes the pain as a dull aching, and occasionally sharp pain diffusely located to the ankle that is intermittent in nature.  symptoms are exacerbated with any movement of and any weight bearing on the leg.  Pain is alleviated with rest.  Patient reports instability and weakness.  Of note PMHx of HTN, CAD, TBI, seizure disorder, dementia, Parkinson's, psychosis. PAtient is here today for xrays of the right ankle. \par Patient denies any other complaints at this time.

## 2021-07-20 ENCOUNTER — APPOINTMENT (OUTPATIENT)
Dept: ORTHOPEDIC SURGERY | Facility: CLINIC | Age: 71
End: 2021-07-20
Payer: MEDICARE

## 2021-07-20 DIAGNOSIS — S82.391D OTHER FRACTURE OF LOWER END OF RIGHT TIBIA, SUBSEQUENT ENCOUNTER FOR CLOSED FRACTURE WITH ROUTINE HEALING: ICD-10-CM

## 2021-07-20 PROCEDURE — 99213 OFFICE O/P EST LOW 20 MIN: CPT

## 2021-07-20 PROCEDURE — 73610 X-RAY EXAM OF ANKLE: CPT | Mod: RT

## 2021-07-20 NOTE — DISCUSSION/SUMMARY
[de-identified] : The underlying pathophysiology was reviewed in great detail with the patient as well as the various treatment options, including ice, analgesics, NSAIDs, Physical therapy,  immobilization\par \par Short leg cast was removed today. A CAM walker was provided and fit in clinic today. Activity modifications and restrictions were discussed. Discussed progression to WBAT with assistive device. \par \par A prescription for Physical Therapy was provided.\par \par Paperwork for facility was completed today. \par \par FU 6 weeks.\par \par All questions were answered, all alternatives discussed and the patient is in complete agreement with that plan. Follow-up appointment as instructed. Any issues and the patient will call or come in sooner.

## 2021-07-20 NOTE — HISTORY OF PRESENT ILLNESS
[de-identified] : YAHIR QUINTANA is a 71 year male presenting to the office complaining of right ankle pain. He   presents to the office ambulating with a wheelchair, immobilized in a CAM. He presents today with an aide for assistance as patient is a resident at the [facility].   Patient reports pain began on 05/26/2021. He reports his legs felt weak, gave up\par on him and he fell injuring his right ankle. Patient went to [facility] ED at this time where he had xrays and a CT of the right ankle revealing a distal tibia and fibula fracture.  Patient notes decreased pain since onset. The patient describes the pain as a dull aching, and occasionally sharp pain diffusely located to the ankle that is intermittent in nature.  symptoms are exacerbated with any movement of and any weight bearing on the leg.  Pain is alleviated with rest.  Patient reports instability and weakness.  Of note PMHx of HTN, CAD, TBI, seizure disorder, dementia, Parkinson's, psychosis. PAtient is here today for xrays of the right ankle and cast removal. \par Patient denies any other complaints at this time.

## 2021-07-20 NOTE — PHYSICAL EXAM
[de-identified] : Right Lower Extremity\par o Ankle :\par ¦ Inspection/Palpation : cast removed, mild distal fibula tenderness to palpation with mild localized swelling, no deformities. \par ¦ Range of Motion : moderately restricted in all planes. \par ¦ Stability : no joint instability on provocative testing\par ¦ Strength :  4/5 inversion/eversion\par o Muscle Bulk : no atrophy\par o Sensation : sensation intact to light touch\par o Skin : no skin lesions, no discoloration\par o Vascular Exam : no edema, no cyanosis, posterior tibialis and dorsalis pedis pulses normal \par \par o Foot:\par ¦ Inspection/Palpation : no tenderness to palpation, no swelling\par ¦ Range of Motion : arc of motion full and painless in all planes\par ¦ Stability : no joint instability on provocative testing\par ¦ Strength : all muscles 5/5\par \par \par \par Left Lower Extremity\par o Ankle :\par ¦ Inspection/Palpation : no tenderness to palpation, no swelling, no deformities\par ¦ Range of Motion : arc of motion full and painless in all planes\par ¦ Stability : no joint instability on provocative testing\par ¦ Strength : all muscles 5/5\par o Muscle Bulk : no atrophy\par o Sensation : sensation intact to light touch\par o Skin : no skin lesions, no discoloration\par o Vascular Exam : no edema, no cyanosis,  posterior tibialis and dorsalis pedis pulses normal \par \par   [de-identified] : o Right  Ankle: AP, lateral and oblique views were obtained, there are no soft tissue abnormalities,  alignment is normal, normal appearing joint spaces, non displaced distal tibial and fibular fractures healing well with callus formation present, normal bone density, no bony lesions.\par  \par ----------------------------------------------------------------------------------------------------------------------------------------------------------------------------------- \par \par Patient comes to today's visit with outside imaging already performed. I reviewed the images in detail with the patient and discussed the findings as highlighted below.\par \par o  CT of the right ankle performed on 05/27/2021 at [facility]: Impression: \par ¦ Intra-articular coronally oriented fracture of the distal tibia and fibula\par \par \par

## 2021-08-31 NOTE — ED ADULT NURSE NOTE - NS ED PATIENT SAFETY CONCERN
You can squeeze her in 9/3 at 10 or 10:30. Make sure ca/vitamin D/PTH done sometime this week   Dr. Heath     No

## 2021-10-01 PROBLEM — Z00.00 ENCOUNTER FOR PREVENTIVE HEALTH EXAMINATION: Noted: 2021-10-01

## 2021-10-04 RX ORDER — PRIMIDONE 250 MG/1
2 TABLET ORAL
Qty: 0 | Refills: 0 | DISCHARGE

## 2021-10-04 RX ORDER — LACOSAMIDE 50 MG/1
300 TABLET ORAL
Qty: 0 | Refills: 0 | DISCHARGE

## 2021-10-04 RX ORDER — PRIMIDONE 250 MG/1
1 TABLET ORAL
Qty: 0 | Refills: 0 | DISCHARGE

## 2021-10-04 RX ORDER — ACETAMINOPHEN 500 MG
2 TABLET ORAL
Qty: 0 | Refills: 0 | DISCHARGE

## 2021-10-04 RX ORDER — POTASSIUM CHLORIDE 20 MEQ
0 PACKET (EA) ORAL
Qty: 0 | Refills: 0 | DISCHARGE

## 2021-10-04 RX ORDER — FUROSEMIDE 40 MG
0 TABLET ORAL
Qty: 0 | Refills: 0 | DISCHARGE

## 2021-10-04 RX ORDER — ALPRAZOLAM 0.25 MG
0 TABLET ORAL
Qty: 0 | Refills: 0 | DISCHARGE

## 2021-10-04 RX ORDER — LACOSAMIDE 50 MG/1
0 TABLET ORAL
Qty: 0 | Refills: 0 | DISCHARGE

## 2021-10-04 RX ORDER — GABAPENTIN 400 MG/1
0 CAPSULE ORAL
Qty: 0 | Refills: 0 | DISCHARGE

## 2021-10-04 RX ORDER — DIVALPROEX SODIUM 500 MG/1
4 TABLET, DELAYED RELEASE ORAL
Qty: 0 | Refills: 0 | DISCHARGE

## 2021-10-04 RX ORDER — DIVALPROEX SODIUM 500 MG/1
1 TABLET, DELAYED RELEASE ORAL
Qty: 0 | Refills: 0 | DISCHARGE

## 2021-10-04 RX ORDER — SENNA PLUS 8.6 MG/1
1 TABLET ORAL
Qty: 0 | Refills: 0 | DISCHARGE

## 2021-10-05 ENCOUNTER — INPATIENT (INPATIENT)
Facility: HOSPITAL | Age: 71
LOS: 2 days | Discharge: HOME | DRG: 101 | End: 2021-10-08
Attending: HOSPITALIST | Admitting: HOSPITALIST
Payer: MEDICARE

## 2021-10-05 VITALS
OXYGEN SATURATION: 97 % | HEART RATE: 74 BPM | DIASTOLIC BLOOD PRESSURE: 88 MMHG | HEIGHT: 72 IN | WEIGHT: 210.1 LBS | TEMPERATURE: 98 F | SYSTOLIC BLOOD PRESSURE: 150 MMHG

## 2021-10-05 DIAGNOSIS — Z98.890 OTHER SPECIFIED POSTPROCEDURAL STATES: Chronic | ICD-10-CM

## 2021-10-05 DIAGNOSIS — G40.901 EPILEPSY, UNSPECIFIED, NOT INTRACTABLE, WITH STATUS EPILEPTICUS: ICD-10-CM

## 2021-10-05 PROBLEM — F03.90 UNSPECIFIED DEMENTIA WITHOUT BEHAVIORAL DISTURBANCE: Chronic | Status: ACTIVE | Noted: 2021-05-27

## 2021-10-05 PROBLEM — G20 PARKINSON'S DISEASE: Chronic | Status: ACTIVE | Noted: 2021-05-27

## 2021-10-05 PROBLEM — I10 ESSENTIAL (PRIMARY) HYPERTENSION: Chronic | Status: ACTIVE | Noted: 2021-05-27

## 2021-10-05 PROBLEM — F41.1 GENERALIZED ANXIETY DISORDER: Chronic | Status: ACTIVE | Noted: 2021-05-27

## 2021-10-05 PROBLEM — R56.9 UNSPECIFIED CONVULSIONS: Chronic | Status: ACTIVE | Noted: 2021-05-27

## 2021-10-05 LAB
ALBUMIN SERPL ELPH-MCNC: 3.5 G/DL — SIGNIFICANT CHANGE UP (ref 3.3–5)
ALP SERPL-CCNC: 90 U/L — SIGNIFICANT CHANGE UP (ref 40–120)
ALT FLD-CCNC: 29 U/L — SIGNIFICANT CHANGE UP (ref 10–45)
ANION GAP SERPL CALC-SCNC: 12 MMOL/L — SIGNIFICANT CHANGE UP (ref 5–17)
AST SERPL-CCNC: 28 U/L — SIGNIFICANT CHANGE UP (ref 10–40)
BASOPHILS # BLD AUTO: 0.04 K/UL — SIGNIFICANT CHANGE UP (ref 0–0.2)
BASOPHILS NFR BLD AUTO: 0.3 % — SIGNIFICANT CHANGE UP (ref 0–2)
BILIRUB SERPL-MCNC: 0.4 MG/DL — SIGNIFICANT CHANGE UP (ref 0.2–1.2)
BUN SERPL-MCNC: 9 MG/DL — SIGNIFICANT CHANGE UP (ref 7–23)
CALCIUM SERPL-MCNC: 9.1 MG/DL — SIGNIFICANT CHANGE UP (ref 8.4–10.5)
CHLORIDE SERPL-SCNC: 96 MMOL/L — SIGNIFICANT CHANGE UP (ref 96–108)
CO2 SERPL-SCNC: 26 MMOL/L — SIGNIFICANT CHANGE UP (ref 22–31)
CREAT SERPL-MCNC: 0.84 MG/DL — SIGNIFICANT CHANGE UP (ref 0.5–1.3)
EOSINOPHIL # BLD AUTO: 0.02 K/UL — SIGNIFICANT CHANGE UP (ref 0–0.5)
EOSINOPHIL NFR BLD AUTO: 0.2 % — SIGNIFICANT CHANGE UP (ref 0–6)
GLUCOSE SERPL-MCNC: 120 MG/DL — HIGH (ref 70–99)
HCT VFR BLD CALC: 39.6 % — SIGNIFICANT CHANGE UP (ref 39–50)
HGB BLD-MCNC: 13.3 G/DL — SIGNIFICANT CHANGE UP (ref 13–17)
IMM GRANULOCYTES NFR BLD AUTO: 1 % — SIGNIFICANT CHANGE UP (ref 0–1.5)
LYMPHOCYTES # BLD AUTO: 1.82 K/UL — SIGNIFICANT CHANGE UP (ref 1–3.3)
LYMPHOCYTES # BLD AUTO: 14.3 % — SIGNIFICANT CHANGE UP (ref 13–44)
MCHC RBC-ENTMCNC: 29.6 PG — SIGNIFICANT CHANGE UP (ref 27–34)
MCHC RBC-ENTMCNC: 33.6 GM/DL — SIGNIFICANT CHANGE UP (ref 32–36)
MCV RBC AUTO: 88.2 FL — SIGNIFICANT CHANGE UP (ref 80–100)
MONOCYTES # BLD AUTO: 1.29 K/UL — HIGH (ref 0–0.9)
MONOCYTES NFR BLD AUTO: 10.1 % — SIGNIFICANT CHANGE UP (ref 2–14)
NEUTROPHILS # BLD AUTO: 9.43 K/UL — HIGH (ref 1.8–7.4)
NEUTROPHILS NFR BLD AUTO: 74.1 % — SIGNIFICANT CHANGE UP (ref 43–77)
NRBC # BLD: 0 /100 WBCS — SIGNIFICANT CHANGE UP (ref 0–0)
PLATELET # BLD AUTO: 442 K/UL — HIGH (ref 150–400)
POTASSIUM SERPL-MCNC: 4.9 MMOL/L — SIGNIFICANT CHANGE UP (ref 3.5–5.3)
POTASSIUM SERPL-SCNC: 4.9 MMOL/L — SIGNIFICANT CHANGE UP (ref 3.5–5.3)
PROT SERPL-MCNC: 8.3 G/DL — SIGNIFICANT CHANGE UP (ref 6–8.3)
RBC # BLD: 4.49 M/UL — SIGNIFICANT CHANGE UP (ref 4.2–5.8)
RBC # FLD: 13 % — SIGNIFICANT CHANGE UP (ref 10.3–14.5)
SARS-COV-2 RNA SPEC QL NAA+PROBE: SIGNIFICANT CHANGE UP
SODIUM SERPL-SCNC: 134 MMOL/L — LOW (ref 135–145)
TROPONIN I SERPL-MCNC: 0.02 NG/ML — SIGNIFICANT CHANGE UP (ref 0.02–0.06)
WBC # BLD: 12.73 K/UL — HIGH (ref 3.8–10.5)
WBC # FLD AUTO: 12.73 K/UL — HIGH (ref 3.8–10.5)

## 2021-10-05 PROCEDURE — 72100 X-RAY EXAM L-S SPINE 2/3 VWS: CPT | Mod: 26

## 2021-10-05 PROCEDURE — 70450 CT HEAD/BRAIN W/O DYE: CPT | Mod: 26,MA

## 2021-10-05 PROCEDURE — 99285 EMERGENCY DEPT VISIT HI MDM: CPT

## 2021-10-05 PROCEDURE — 99223 1ST HOSP IP/OBS HIGH 75: CPT

## 2021-10-05 PROCEDURE — 93010 ELECTROCARDIOGRAM REPORT: CPT

## 2021-10-05 RX ORDER — RISPERIDONE 4 MG/1
1 TABLET ORAL
Qty: 0 | Refills: 0 | DISCHARGE

## 2021-10-05 RX ORDER — LEVOTHYROXINE SODIUM 125 MCG
25 TABLET ORAL DAILY
Refills: 0 | Status: DISCONTINUED | OUTPATIENT
Start: 2021-10-05 | End: 2021-10-08

## 2021-10-05 RX ORDER — LACOSAMIDE 50 MG/1
300 TABLET ORAL
Refills: 0 | Status: DISCONTINUED | OUTPATIENT
Start: 2021-10-05 | End: 2021-10-08

## 2021-10-05 RX ORDER — DIVALPROEX SODIUM 500 MG/1
500 TABLET, DELAYED RELEASE ORAL THREE TIMES A DAY
Refills: 0 | Status: DISCONTINUED | OUTPATIENT
Start: 2021-10-05 | End: 2021-10-08

## 2021-10-05 RX ORDER — METOPROLOL TARTRATE 50 MG
25 TABLET ORAL
Refills: 0 | Status: DISCONTINUED | OUTPATIENT
Start: 2021-10-05 | End: 2021-10-08

## 2021-10-05 RX ORDER — LACOSAMIDE 50 MG/1
200 TABLET ORAL ONCE
Refills: 0 | Status: DISCONTINUED | OUTPATIENT
Start: 2021-10-05 | End: 2021-10-05

## 2021-10-05 RX ORDER — OXYCODONE AND ACETAMINOPHEN 5; 325 MG/1; MG/1
1 TABLET ORAL ONCE
Refills: 0 | Status: DISCONTINUED | OUTPATIENT
Start: 2021-10-05 | End: 2021-10-05

## 2021-10-05 RX ORDER — MEMANTINE HYDROCHLORIDE 10 MG/1
10 TABLET ORAL AT BEDTIME
Refills: 0 | Status: DISCONTINUED | OUTPATIENT
Start: 2021-10-05 | End: 2021-10-08

## 2021-10-05 RX ORDER — CARBIDOPA AND LEVODOPA 25; 100 MG/1; MG/1
1 TABLET ORAL
Refills: 0 | Status: DISCONTINUED | OUTPATIENT
Start: 2021-10-05 | End: 2021-10-08

## 2021-10-05 RX ORDER — LIDOCAINE 4 G/100G
1 CREAM TOPICAL ONCE
Refills: 0 | Status: COMPLETED | OUTPATIENT
Start: 2021-10-05 | End: 2021-10-05

## 2021-10-05 RX ORDER — ALPRAZOLAM 0.25 MG
0.25 TABLET ORAL
Refills: 0 | Status: DISCONTINUED | OUTPATIENT
Start: 2021-10-05 | End: 2021-10-08

## 2021-10-05 RX ORDER — LACOSAMIDE 50 MG/1
200 TABLET ORAL
Refills: 0 | Status: DISCONTINUED | OUTPATIENT
Start: 2021-10-05 | End: 2021-10-08

## 2021-10-05 RX ORDER — HEPARIN SODIUM 5000 [USP'U]/ML
5000 INJECTION INTRAVENOUS; SUBCUTANEOUS EVERY 8 HOURS
Refills: 0 | Status: DISCONTINUED | OUTPATIENT
Start: 2021-10-05 | End: 2021-10-08

## 2021-10-05 RX ORDER — VALPROIC ACID (AS SODIUM SALT) 250 MG/5ML
500 SOLUTION, ORAL ORAL ONCE
Refills: 0 | Status: COMPLETED | OUTPATIENT
Start: 2021-10-05 | End: 2021-10-05

## 2021-10-05 RX ORDER — ACETAMINOPHEN 500 MG
650 TABLET ORAL ONCE
Refills: 0 | Status: COMPLETED | OUTPATIENT
Start: 2021-10-05 | End: 2021-10-05

## 2021-10-05 RX ORDER — ESCITALOPRAM OXALATE 10 MG/1
20 TABLET, FILM COATED ORAL DAILY
Refills: 0 | Status: DISCONTINUED | OUTPATIENT
Start: 2021-10-05 | End: 2021-10-08

## 2021-10-05 RX ORDER — QUETIAPINE FUMARATE 200 MG/1
0 TABLET, FILM COATED ORAL
Qty: 0 | Refills: 0 | DISCHARGE

## 2021-10-05 RX ORDER — PRIMIDONE 250 MG/1
50 TABLET ORAL
Refills: 0 | Status: DISCONTINUED | OUTPATIENT
Start: 2021-10-05 | End: 2021-10-08

## 2021-10-05 RX ORDER — ASPIRIN/CALCIUM CARB/MAGNESIUM 324 MG
81 TABLET ORAL DAILY
Refills: 0 | Status: DISCONTINUED | OUTPATIENT
Start: 2021-10-05 | End: 2021-10-08

## 2021-10-05 RX ORDER — SENNA PLUS 8.6 MG/1
1 TABLET ORAL
Qty: 0 | Refills: 0 | DISCHARGE

## 2021-10-05 RX ORDER — FUROSEMIDE 40 MG
40 TABLET ORAL DAILY
Refills: 0 | Status: DISCONTINUED | OUTPATIENT
Start: 2021-10-05 | End: 2021-10-08

## 2021-10-05 RX ADMIN — LACOSAMIDE 300 MILLIGRAM(S): 50 TABLET ORAL at 22:02

## 2021-10-05 RX ADMIN — Medication 650 MILLIGRAM(S): at 19:11

## 2021-10-05 RX ADMIN — LIDOCAINE 1 PATCH: 4 CREAM TOPICAL at 09:27

## 2021-10-05 RX ADMIN — HEPARIN SODIUM 5000 UNIT(S): 5000 INJECTION INTRAVENOUS; SUBCUTANEOUS at 22:10

## 2021-10-05 RX ADMIN — DIVALPROEX SODIUM 500 MILLIGRAM(S): 500 TABLET, DELAYED RELEASE ORAL at 22:05

## 2021-10-05 RX ADMIN — Medication 2 MILLIGRAM(S): at 15:38

## 2021-10-05 RX ADMIN — Medication 1 MILLIGRAM(S): at 15:52

## 2021-10-05 RX ADMIN — OXYCODONE AND ACETAMINOPHEN 1 TABLET(S): 5; 325 TABLET ORAL at 09:27

## 2021-10-05 RX ADMIN — MEMANTINE HYDROCHLORIDE 10 MILLIGRAM(S): 10 TABLET ORAL at 22:16

## 2021-10-05 RX ADMIN — LACOSAMIDE 140 MILLIGRAM(S): 50 TABLET ORAL at 17:15

## 2021-10-05 RX ADMIN — LIDOCAINE 1 PATCH: 4 CREAM TOPICAL at 21:47

## 2021-10-05 RX ADMIN — Medication 650 MILLIGRAM(S): at 15:10

## 2021-10-05 RX ADMIN — Medication 27.5 MILLIGRAM(S): at 15:56

## 2021-10-05 RX ADMIN — OXYCODONE AND ACETAMINOPHEN 1 TABLET(S): 5; 325 TABLET ORAL at 15:05

## 2021-10-05 RX ADMIN — Medication 1 MILLIGRAM(S): at 16:12

## 2021-10-05 NOTE — ED PROVIDER NOTE - PATIENT PORTAL LINK FT
You can access the FollowMyHealth Patient Portal offered by [facility] by registering at the following website: [email or web address]. By joining [employer]’s FollowMyHealth portal, you will also be able to view your health information using other applications (apps) compatible with our system.

## 2021-10-05 NOTE — ED PROVIDER NOTE - CLINICAL SUMMARY MEDICAL DECISION MAKING FREE TEXT BOX
fall accidental head injury lbp ct brain neg xr l spine neg fx , + DJD fall accidental head injury lbp ct brain neg xr l spine neg fx , + DJD  3.30 pm pt started having sz when on transport stretches dc held fall accidental head injury lbp ct brain neg xr l spine neg fx , + DJD  3.30 pm pt started having sz when on transport stretches dc held  sz focal pt was conscious throught sz   pt neuro dr felipe bonilla paged [phone] call baclk awaited  4.45 pm pt seizure stopped   5 pm d/w dr neff recommended admit for observation case endorsed to dr batista hospitalist fall accidental head injury lbp ct brain neg xr l spine neg fx , + DJD  3.30 pm pt started having sz when on transport stretches dc held  sz focal pt was conscious throught sz   pt neuro dr felipe bonilla paged [phone] call back awaited  4.45 pm pt seizure stopped   5 pm d/w dr neff recommended admit for observation case endorsed to dr batista hospitalist

## 2021-10-05 NOTE — ED PROVIDER NOTE - OBJECTIVE STATEMENT
fell today in assisted living backwards minimal bruising of scalp , and low back pain hx of parkinsons dz fell today in assisted living backwards minimal bruising of scalp , and low back pain hx of parkinsons dz  71 y m past hx of sz, parkinsons dz came to ed initially from fall head injury and low back pain   onset sudden   locations head and low back pain   duration 1 day   characteristics back pain head injury   context fall   aggravating factors movement   relieving factors rest   timming constant   severity moderate

## 2021-10-05 NOTE — ED ADULT NURSE NOTE - OBJECTIVE STATEMENT
Patient presents to ED from [facility] for mechanical fall this morning in the bathroom. Pt states he hit the back of his head and now has head pain and lower back pain. Pt denies chest pain, dizziness, SOB, weakness, or other complaints. Pt

## 2021-10-05 NOTE — ED PROVIDER NOTE - NSFOLLOWUPINSTRUCTIONS_ED_ALL_ED_FT
Rest, drink plenty of fluids  Advance activity as tolerated  Continue all previously prescribed medications as directed  Follow up with your PMD 2-3 days- bring copies of your results  Return to the ER for worsening  tylenol for pain

## 2021-10-05 NOTE — ED ADULT NURSE REASSESSMENT NOTE - NS ED NURSE REASSESS COMMENT FT1
Seizure activity has resolved, MD aware, patient lethargic however arousable to voice, answering questions, airway patent

## 2021-10-05 NOTE — ED PROVIDER NOTE - CARE PLAN
1 Principal Discharge DX:	Closed head injury  Secondary Diagnosis:	Accidental fall  Secondary Diagnosis:	H/O low back pain   Principal Discharge DX:	Status epilepticus  Secondary Diagnosis:	Accidental fall  Secondary Diagnosis:	H/O low back pain  Secondary Diagnosis:	Closed head injury

## 2021-10-05 NOTE — H&P ADULT - ASSESSMENT
70yo male with hx of Seizure d/o, PD, presented to the ED due to unwitnessed fall at [facility] and noted to have seizure episodes en route to the ED    #Seizure D/o   -Likely secondary to missing dose of Medication. Although possibly secondary to head trauma from fall, CT Head was negative acute findings  -Continue Home meds  -Follow up with Vimpat levels  -Neurology consulted   -Neurochecks Q4hrs  -Aspiration/Fall/Seizure precautions    #Parkinson's Disease  -Continue Carbidopa/Levadopa     #HTN  -Metoprolol    #Hypothyroidism  -Synthroid    #DVT ppx  -HSQ  IMPROVE VTE Individual Risk Assessment          RISK                                                          Points  [  ] Previous VTE                                                3  [  ] Thrombophilia                                             2  [  ] Lower limb paralysis                                   2        (unable to hold up >15 seconds)    [  ] Current Cancer                                             2         (within 6 months)  [  ] Immobilization > 24 hrs                              1  [  ] ICU/CCU stay > 24 hours                             1  [  ] Age > 60                                                         1    IMPROVE VTE Score:         [   2      ]    Total Risk Factor Score:    0 - 1:   Consider IPC  >2 - 3:  Thromboprophylaxis required (enoxaparin or SQ heparin)        >4:   High Risk: Thromboprophylaxis required (enoxaparin or SQ heparin), optional add IPC  **If CONTRAINDICATION to enoxaparin or SQ heparin, USE IPCs**      Contacted Scarlet BAKER, [phone]    72yo male with hx of Seizure d/o, PD, presented to the ED due to unwitnessed fall at [facility] and noted to have seizure episodes en route to the ED    #Seizure D/o   -Likely secondary to missing dose of Medication. Although possibly secondary to head trauma from fall, CT Head was negative acute findings  -Continue Home meds  -Follow up with Vimpat levels  -Neurology consulted   -Neurochecks Q4hrs  -Aspiration/Fall/Seizure precautions    #Fall  -PT consult  -Fall precaution    #Parkinson's Disease  -Continue Carbidopa/Levadopa     #HTN  -Metoprolol    #Hypothyroidism  -Synthroid    #DVT ppx  -HSQ  IMPROVE VTE Individual Risk Assessment          RISK                                                          Points  [  ] Previous VTE                                                3  [  ] Thrombophilia                                             2  [  ] Lower limb paralysis                                   2        (unable to hold up >15 seconds)    [  ] Current Cancer                                             2         (within 6 months)  [  ] Immobilization > 24 hrs                              1  [  ] ICU/CCU stay > 24 hours                             1  [  ] Age > 60                                                         1    IMPROVE VTE Score:         [   2      ]    Total Risk Factor Score:    0 - 1:   Consider IPC  >2 - 3:  Thromboprophylaxis required (enoxaparin or SQ heparin)        >4:   High Risk: Thromboprophylaxis required (enoxaparin or SQ heparin), optional add IPC  **If CONTRAINDICATION to enoxaparin or SQ heparin, USE IPCs**      Contacted Scarlet BAKER, [phone]

## 2021-10-05 NOTE — ED ADULT NURSE NOTE - NSIMPLEMENTINTERV_GEN_ALL_ED
Implemented All Fall Risk Interventions:  [place] to call system. Call bell, personal items and telephone within reach. Instruct patient to call for assistance. Room bathroom lighting operational. Non-slip footwear when patient is off stretcher. Physically safe environment: no spills, clutter or unnecessary equipment. Stretcher in lowest position, wheels locked, appropriate side rails in place. Provide visual cue, wrist band, yellow gown, etc. Monitor gait and stability. Monitor for mental status changes and reorient to person, place, and time. Review medications for side effects contributing to fall risk. Reinforce activity limits and safety measures with patient and family.

## 2021-10-05 NOTE — H&P ADULT - HISTORY OF PRESENT ILLNESS
72yo male with hx of Seizure d/o, PD, presented to the ED due to unwitnessed fall at [facility] and noted to have seizure episodes en route to the ED. Pt is somnolent/sedated in the ED, unable to provide hx. Minimal Hx provided by RN, Minnie, at [facility]. Per Minnie, pt had an unwitness fall in the AM while in the bathroom. Unsure if there as LOC or head trauma. While en route to the ED, pt was noted to have sustained tonic clonic seizure in the ambulance for approximately 1 hr. Per ED, pt missed his seizure medications this AM. Pt was given multiple rounds of Ativan, Depakote and Vimpat.

## 2021-10-05 NOTE — GOALS OF CARE CONVERSATION - ADVANCED CARE PLANNING - CONVERSATION DETAILS
Met with patient in [facility] ED, he lives at [facility] assisted living and had mechanical fall. He is A&Ox3. Has hx. TBI, Parkinsons, seizures, CHF. His POA/HCP is friend Corinne who lives in [place]. I called her to confirm that patient stated he wants attempted resuscitation and intubation in the event of cardiac arrest. She states his mentation occassionally changes due to the Parkinsonism. She stated his long term Neurologist is Dr. Teddy Mcclellan, at [address] [phone]. He has phone appointment with neurologist this Friday, and MRI of head next week.

## 2021-10-05 NOTE — ED ADULT NURSE REASSESSMENT NOTE - NS ED NURSE REASSESS COMMENT FT1
Patient noted to be shaking and having seizure-like activity while being transported from bed to stretcher. MD immediately at bedside and medications given as ordered. Pt awake, alert, answering questions, airway patent

## 2021-10-05 NOTE — ED PROVIDER NOTE - PHYSICAL EXAMINATION
None
General:     NAD, well-nourished, well-appearing elderly frail   Head:     NC/ecchymosis post scalp  EOMI,  dry mucosa moist  Neck:     trachea midline  Lungs:     CTA b/l, no w/r/r  CVS:     S1S2, RRR, no m/g/r  Abd:     +BS, s/nt/nd, no organomegaly  Ext:    2+ radial and pedal pulses, no c/c/e  Neuro: alert awake verbally responsive baseline confused no sensory/motor deficits  msk + lumbar tenderness lower midline

## 2021-10-06 LAB
ANION GAP SERPL CALC-SCNC: 10 MMOL/L — SIGNIFICANT CHANGE UP (ref 5–17)
BASOPHILS # BLD AUTO: 0.04 K/UL — SIGNIFICANT CHANGE UP (ref 0–0.2)
BASOPHILS NFR BLD AUTO: 0.5 % — SIGNIFICANT CHANGE UP (ref 0–2)
BUN SERPL-MCNC: 8 MG/DL — SIGNIFICANT CHANGE UP (ref 7–23)
CALCIUM SERPL-MCNC: 8.9 MG/DL — SIGNIFICANT CHANGE UP (ref 8.4–10.5)
CHLORIDE SERPL-SCNC: 101 MMOL/L — SIGNIFICANT CHANGE UP (ref 96–108)
CO2 SERPL-SCNC: 24 MMOL/L — SIGNIFICANT CHANGE UP (ref 22–31)
COVID-19 SPIKE DOMAIN AB INTERP: POSITIVE
COVID-19 SPIKE DOMAIN ANTIBODY RESULT: 68 U/ML — HIGH
CREAT SERPL-MCNC: 0.67 MG/DL — SIGNIFICANT CHANGE UP (ref 0.5–1.3)
EOSINOPHIL # BLD AUTO: 0.18 K/UL — SIGNIFICANT CHANGE UP (ref 0–0.5)
EOSINOPHIL NFR BLD AUTO: 2.2 % — SIGNIFICANT CHANGE UP (ref 0–6)
GLUCOSE SERPL-MCNC: 95 MG/DL — SIGNIFICANT CHANGE UP (ref 70–99)
HCT VFR BLD CALC: 40 % — SIGNIFICANT CHANGE UP (ref 39–50)
HGB BLD-MCNC: 13.2 G/DL — SIGNIFICANT CHANGE UP (ref 13–17)
IMM GRANULOCYTES NFR BLD AUTO: 0.6 % — SIGNIFICANT CHANGE UP (ref 0–1.5)
LYMPHOCYTES # BLD AUTO: 1.6 K/UL — SIGNIFICANT CHANGE UP (ref 1–3.3)
LYMPHOCYTES # BLD AUTO: 19.6 % — SIGNIFICANT CHANGE UP (ref 13–44)
MCHC RBC-ENTMCNC: 29.4 PG — SIGNIFICANT CHANGE UP (ref 27–34)
MCHC RBC-ENTMCNC: 33 GM/DL — SIGNIFICANT CHANGE UP (ref 32–36)
MCV RBC AUTO: 89.1 FL — SIGNIFICANT CHANGE UP (ref 80–100)
MONOCYTES # BLD AUTO: 0.88 K/UL — SIGNIFICANT CHANGE UP (ref 0–0.9)
MONOCYTES NFR BLD AUTO: 10.8 % — SIGNIFICANT CHANGE UP (ref 2–14)
NEUTROPHILS # BLD AUTO: 5.43 K/UL — SIGNIFICANT CHANGE UP (ref 1.8–7.4)
NEUTROPHILS NFR BLD AUTO: 66.3 % — SIGNIFICANT CHANGE UP (ref 43–77)
NRBC # BLD: 0 /100 WBCS — SIGNIFICANT CHANGE UP (ref 0–0)
PLATELET # BLD AUTO: 367 K/UL — SIGNIFICANT CHANGE UP (ref 150–400)
POTASSIUM SERPL-MCNC: 4.4 MMOL/L — SIGNIFICANT CHANGE UP (ref 3.5–5.3)
POTASSIUM SERPL-SCNC: 4.4 MMOL/L — SIGNIFICANT CHANGE UP (ref 3.5–5.3)
RBC # BLD: 4.49 M/UL — SIGNIFICANT CHANGE UP (ref 4.2–5.8)
RBC # FLD: 13.1 % — SIGNIFICANT CHANGE UP (ref 10.3–14.5)
SARS-COV-2 IGG+IGM SERPL QL IA: 68 U/ML — HIGH
SARS-COV-2 IGG+IGM SERPL QL IA: POSITIVE
SODIUM SERPL-SCNC: 135 MMOL/L — SIGNIFICANT CHANGE UP (ref 135–145)
VALPROATE SERPL-MCNC: 39 UG/ML — LOW (ref 50–100)
WBC # BLD: 8.18 K/UL — SIGNIFICANT CHANGE UP (ref 3.8–10.5)
WBC # FLD AUTO: 8.18 K/UL — SIGNIFICANT CHANGE UP (ref 3.8–10.5)

## 2021-10-06 PROCEDURE — 99233 SBSQ HOSP IP/OBS HIGH 50: CPT

## 2021-10-06 RX ORDER — INFLUENZA VIRUS VACCINE 15; 15; 15; 15 UG/.5ML; UG/.5ML; UG/.5ML; UG/.5ML
0.5 SUSPENSION INTRAMUSCULAR ONCE
Refills: 0 | Status: DISCONTINUED | OUTPATIENT
Start: 2021-10-06 | End: 2021-10-08

## 2021-10-06 RX ADMIN — Medication 0.25 MILLIGRAM(S): at 05:56

## 2021-10-06 RX ADMIN — DIVALPROEX SODIUM 500 MILLIGRAM(S): 500 TABLET, DELAYED RELEASE ORAL at 05:56

## 2021-10-06 RX ADMIN — MEMANTINE HYDROCHLORIDE 10 MILLIGRAM(S): 10 TABLET ORAL at 21:12

## 2021-10-06 RX ADMIN — CARBIDOPA AND LEVODOPA 1 TABLET(S): 25; 100 TABLET ORAL at 05:56

## 2021-10-06 RX ADMIN — HEPARIN SODIUM 5000 UNIT(S): 5000 INJECTION INTRAVENOUS; SUBCUTANEOUS at 21:12

## 2021-10-06 RX ADMIN — Medication 0.25 MILLIGRAM(S): at 17:36

## 2021-10-06 RX ADMIN — HEPARIN SODIUM 5000 UNIT(S): 5000 INJECTION INTRAVENOUS; SUBCUTANEOUS at 05:56

## 2021-10-06 RX ADMIN — LACOSAMIDE 300 MILLIGRAM(S): 50 TABLET ORAL at 21:11

## 2021-10-06 RX ADMIN — DIVALPROEX SODIUM 500 MILLIGRAM(S): 500 TABLET, DELAYED RELEASE ORAL at 21:12

## 2021-10-06 RX ADMIN — CARBIDOPA AND LEVODOPA 1 TABLET(S): 25; 100 TABLET ORAL at 13:33

## 2021-10-06 RX ADMIN — Medication 40 MILLIGRAM(S): at 05:56

## 2021-10-06 RX ADMIN — Medication 25 MICROGRAM(S): at 05:56

## 2021-10-06 RX ADMIN — DIVALPROEX SODIUM 500 MILLIGRAM(S): 500 TABLET, DELAYED RELEASE ORAL at 14:34

## 2021-10-06 RX ADMIN — PRIMIDONE 50 MILLIGRAM(S): 250 TABLET ORAL at 17:36

## 2021-10-06 RX ADMIN — HEPARIN SODIUM 5000 UNIT(S): 5000 INJECTION INTRAVENOUS; SUBCUTANEOUS at 13:33

## 2021-10-06 RX ADMIN — CARBIDOPA AND LEVODOPA 1 TABLET(S): 25; 100 TABLET ORAL at 01:52

## 2021-10-06 RX ADMIN — CARBIDOPA AND LEVODOPA 1 TABLET(S): 25; 100 TABLET ORAL at 21:12

## 2021-10-06 RX ADMIN — ESCITALOPRAM OXALATE 20 MILLIGRAM(S): 10 TABLET, FILM COATED ORAL at 14:33

## 2021-10-06 RX ADMIN — PRIMIDONE 50 MILLIGRAM(S): 250 TABLET ORAL at 05:58

## 2021-10-06 RX ADMIN — Medication 25 MILLIGRAM(S): at 05:58

## 2021-10-06 RX ADMIN — Medication 81 MILLIGRAM(S): at 14:33

## 2021-10-06 RX ADMIN — LACOSAMIDE 200 MILLIGRAM(S): 50 TABLET ORAL at 09:09

## 2021-10-06 RX ADMIN — Medication 25 MILLIGRAM(S): at 17:36

## 2021-10-06 RX ADMIN — LIDOCAINE 1 PATCH: 4 CREAM TOPICAL at 00:56

## 2021-10-06 RX ADMIN — CARBIDOPA AND LEVODOPA 1 TABLET(S): 25; 100 TABLET ORAL at 17:36

## 2021-10-06 NOTE — PHYSICAL THERAPY INITIAL EVALUATION ADULT - RANGE OF MOTION EXAMINATION, REHAB EVAL
Right UE ROM was WNL (within normal limits)/bilateral lower extremity was ROM was WNL (within normal limits)

## 2021-10-06 NOTE — PROGRESS NOTE ADULT - ASSESSMENT
72yo male with hx of Seizure d/o, PD, presented to the ED due to unwitnessed fall at [facility] and noted to have seizure episodes en route to the ED    Seizure Disorder likely secondary to missed dose of medication  - unwitnessed fall at [place] then toxic clonic seizure in EMS on way to ER  - cont home meds  - follow up with neurology  - discussed with Dr. Teddy Mcclellan  -Neurochecks Q4hrs  -Aspiration/Fall/Seizure precautions    Parkinson's Disease  -Continue Carbidopa/Levadopa     HTN  - Metoprolol    Hypothyroidism  -Synthroid    DVT ppx  -HSQ    Discussed with [employer], [phone]    70yo male with hx of Seizure d/o, PD, presented to the ED due to unwitnessed fall at [facility] and noted to have seizure episodes en route to the ED    Seizure Disorder likely secondary to missed dose of medication  - unwitnessed fall at [place] then toxic clonic seizure in EMS on way to ER  - cont home meds  - follow up with neurology - discussed with Dr. Lira - ct head in AM, check vimpat levels, magnesium levels, continue home meds  - discussed with Dr. Teddy Mcclellan  -Neurochecks Q4hrs  -Aspiration/Fall/Seizure precautions      Parkinson's Disease  -Continue Carbidopa/Levadopa     HTN  - Metoprolol    Hypothyroidism  -Synthroid    DVT ppx  -HSQ    Discussed with [employer], [phone]

## 2021-10-06 NOTE — CONSULT NOTE ADULT - SUBJECTIVE AND OBJECTIVE BOX
Patient is a 71 year old man, SNF resident, admitted 10/5/21 yesterday. He fell in the bathroom and en route to the hospital had focal motor seizures. In the ER he also had focal motor seizures and given IV ativan, lacosamide, and valproic Acid (Depacon). He has a history of seizures for which he has been on lacosamide, valproic Acid DR, and mysoline. He did not receive the morning seizure medications.He states he was in the bathroom and reached out with his right hand and fell forward striking his head. He states has right-sided HA. He complains of feeling generally unwell.    PMH: As above           TBI           Seizures -3 years . Valproic acid DR-500mg tid, Lacosamide-200mg in the morning and 300mg in the evening, mysoline-50mg bid- Dr. Teddy Mcclellan           Parkinsons Disease-Sinemet 25/100 qid- Dr. Teddy Mcclellan           HTN               SH: Allergy to Keppra    Exam: Awake, slowed mentation, appropriate           Pupils 2.5mm, EOM intact, no nystagmus, VFF           CN II-XII intact           Motor tone and strength-normal, no rigidity ,tremor-coarse right upper extremity on maintaining a position and movement, no tremor at rest.                                         13.2   8.18  )-----------( 367      ( 06 Oct 2021 05:30 )             40.0       10-06    135  |  101  |  8   ----------------------------<  95  4.4   |  24  |  0.67    Ca    8.9      06 Oct 2021 05:30    TPro  8.3  /  Alb  3.5  /  TBili  0.4  /  DBili  x   /  AST  28  /  ALT  29  /  AlkPhos  90  10-05        Valproic Acid Level, Serum: 39 ug/mL (10.05.21 @ 16:25)          < from: CT Head No Cont (10.05.21 @ 09:35) >    COMPARISON: CT brain 5/27/2021    FINDINGS:    Redemonstration of right hemicraniectomy and right frontal jase holes.    Similar extensive multifocal encephalomalacia involving the bilateral frontal and right anterior temporal lobes.    No acute intracranial hemorrhage or brain edema.    Ventricles are similar in size and mild to moderately dilated. The left ventricular body and frontal horn is persistently, asymmetrically enlarged.    No displaced calvarial fracture.    IMPRESSION:    No acute intracranial hemorrhage, brain edema, or mass effect.  No displaced calvarial fracture.  Persistent mild to moderate ventricular dilatation.  Persistent asymmetricenlargement of the left ventricular body and frontal horn.

## 2021-10-06 NOTE — PATIENT PROFILE ADULT - NSPROPOAURINARYCATHETER_GEN_A_NUR
Problem: PAIN - ADULT  Goal: Verbalizes/displays adequate comfort level or patient's stated pain goal  INTERVENTIONS:  - Encourage pt to monitor pain and request assistance  - Assess pain using appropriate pain scale  - Administer analgesics based on type rails  - Instruct patient/family to notify RN of any seizure activity  - Instruct patient/family to call for assistance with activity based on assessment  Outcome: Progressing  No further occurrence of seizures, will continue to monitor, neurologist to see Progressing  Hx.  Of anxiety and depression, psychiatrist to see patietn no

## 2021-10-06 NOTE — CONSULT NOTE ADULT - ASSESSMENT
Patient is a 71 year old man, SNF resident, admitted 10/5/21 yesterday. He fell in the bathroom and en route to the hospital had focal motor seizures. In the ER he also had focal motor seizures and given IV ativan, lacosamide, and valproic Acid (Depacon). He has a history of seizures for which he has been on lacosamide, valproic Acid DR, and mysoline. He did not receive the morning seizure medications. He states he was in the bathroom and reached out with his right hand and fell forward striking his head. He states has right-sided HA. He complains of feeling generally unwell. Neurological exam as above.    1) Continue anticonvulsants, lacosamide, valproic acid DR, and mysoline as prior to admission.  2) Magnesium level  3) Repeat valproic acid level  4) F/U CT Head no contrast to evaluate delayed hemorrhage  5) Continue Sinemet 25/100 qid

## 2021-10-06 NOTE — PHYSICAL THERAPY INITIAL EVALUATION ADULT - PERTINENT HX OF CURRENT PROBLEM, REHAB EVAL
s/p fall, CT head unremarkable, +sustained tonic clonic seizures en route to ER x 1 hour; now presents to PT

## 2021-10-06 NOTE — CONSULT NOTE ADULT - CONSULT REASON
Seizure Disorder
PMHx:  no pertinent Pmhx except mentioned in HPI  PSHx: no pertinent Pshx except mentioned in  HPI  FHx:  no pertinent Fhx except mentioned in  HPI  Soc Hx: Denies EtOH/tobacco/illicit substance use.

## 2021-10-07 LAB
ANION GAP SERPL CALC-SCNC: 8 MMOL/L — SIGNIFICANT CHANGE UP (ref 5–17)
BUN SERPL-MCNC: 17 MG/DL — SIGNIFICANT CHANGE UP (ref 7–23)
CALCIUM SERPL-MCNC: 8.6 MG/DL — SIGNIFICANT CHANGE UP (ref 8.4–10.5)
CHLORIDE SERPL-SCNC: 99 MMOL/L — SIGNIFICANT CHANGE UP (ref 96–108)
CO2 SERPL-SCNC: 31 MMOL/L — SIGNIFICANT CHANGE UP (ref 22–31)
CREAT SERPL-MCNC: 0.95 MG/DL — SIGNIFICANT CHANGE UP (ref 0.5–1.3)
GLUCOSE SERPL-MCNC: 135 MG/DL — HIGH (ref 70–99)
HCT VFR BLD CALC: 39.3 % — SIGNIFICANT CHANGE UP (ref 39–50)
HGB BLD-MCNC: 12.6 G/DL — LOW (ref 13–17)
MAGNESIUM SERPL-MCNC: 2.5 MG/DL — SIGNIFICANT CHANGE UP (ref 1.6–2.6)
MCHC RBC-ENTMCNC: 29.9 PG — SIGNIFICANT CHANGE UP (ref 27–34)
MCHC RBC-ENTMCNC: 32.1 GM/DL — SIGNIFICANT CHANGE UP (ref 32–36)
MCV RBC AUTO: 93.1 FL — SIGNIFICANT CHANGE UP (ref 80–100)
NRBC # BLD: 0 /100 WBCS — SIGNIFICANT CHANGE UP (ref 0–0)
PLATELET # BLD AUTO: 339 K/UL — SIGNIFICANT CHANGE UP (ref 150–400)
POTASSIUM SERPL-MCNC: 4.1 MMOL/L — SIGNIFICANT CHANGE UP (ref 3.5–5.3)
POTASSIUM SERPL-SCNC: 4.1 MMOL/L — SIGNIFICANT CHANGE UP (ref 3.5–5.3)
RBC # BLD: 4.22 M/UL — SIGNIFICANT CHANGE UP (ref 4.2–5.8)
RBC # FLD: 14.5 % — SIGNIFICANT CHANGE UP (ref 10.3–14.5)
SODIUM SERPL-SCNC: 138 MMOL/L — SIGNIFICANT CHANGE UP (ref 135–145)
WBC # BLD: 10.77 K/UL — HIGH (ref 3.8–10.5)
WBC # FLD AUTO: 10.77 K/UL — HIGH (ref 3.8–10.5)

## 2021-10-07 PROCEDURE — 99233 SBSQ HOSP IP/OBS HIGH 50: CPT

## 2021-10-07 PROCEDURE — 70450 CT HEAD/BRAIN W/O DYE: CPT | Mod: 26

## 2021-10-07 RX ORDER — ACETAMINOPHEN 500 MG
650 TABLET ORAL ONCE
Refills: 0 | Status: COMPLETED | OUTPATIENT
Start: 2021-10-07 | End: 2021-10-07

## 2021-10-07 RX ORDER — MORPHINE SULFATE 50 MG/1
1 CAPSULE, EXTENDED RELEASE ORAL ONCE
Refills: 0 | Status: DISCONTINUED | OUTPATIENT
Start: 2021-10-07 | End: 2021-10-07

## 2021-10-07 RX ORDER — OXYCODONE AND ACETAMINOPHEN 5; 325 MG/1; MG/1
1 TABLET ORAL EVERY 6 HOURS
Refills: 0 | Status: DISCONTINUED | OUTPATIENT
Start: 2021-10-07 | End: 2021-10-08

## 2021-10-07 RX ADMIN — Medication 0.25 MILLIGRAM(S): at 18:34

## 2021-10-07 RX ADMIN — DIVALPROEX SODIUM 500 MILLIGRAM(S): 500 TABLET, DELAYED RELEASE ORAL at 15:15

## 2021-10-07 RX ADMIN — Medication 650 MILLIGRAM(S): at 05:39

## 2021-10-07 RX ADMIN — MORPHINE SULFATE 1 MILLIGRAM(S): 50 CAPSULE, EXTENDED RELEASE ORAL at 12:35

## 2021-10-07 RX ADMIN — PRIMIDONE 50 MILLIGRAM(S): 250 TABLET ORAL at 18:34

## 2021-10-07 RX ADMIN — Medication 25 MILLIGRAM(S): at 05:38

## 2021-10-07 RX ADMIN — Medication 650 MILLIGRAM(S): at 06:09

## 2021-10-07 RX ADMIN — ESCITALOPRAM OXALATE 20 MILLIGRAM(S): 10 TABLET, FILM COATED ORAL at 15:15

## 2021-10-07 RX ADMIN — OXYCODONE AND ACETAMINOPHEN 1 TABLET(S): 5; 325 TABLET ORAL at 18:52

## 2021-10-07 RX ADMIN — LACOSAMIDE 200 MILLIGRAM(S): 50 TABLET ORAL at 08:29

## 2021-10-07 RX ADMIN — CARBIDOPA AND LEVODOPA 1 TABLET(S): 25; 100 TABLET ORAL at 23:56

## 2021-10-07 RX ADMIN — Medication 0.25 MILLIGRAM(S): at 05:38

## 2021-10-07 RX ADMIN — DIVALPROEX SODIUM 500 MILLIGRAM(S): 500 TABLET, DELAYED RELEASE ORAL at 22:24

## 2021-10-07 RX ADMIN — CARBIDOPA AND LEVODOPA 1 TABLET(S): 25; 100 TABLET ORAL at 18:34

## 2021-10-07 RX ADMIN — MORPHINE SULFATE 1 MILLIGRAM(S): 50 CAPSULE, EXTENDED RELEASE ORAL at 12:20

## 2021-10-07 RX ADMIN — CARBIDOPA AND LEVODOPA 1 TABLET(S): 25; 100 TABLET ORAL at 05:38

## 2021-10-07 RX ADMIN — PRIMIDONE 50 MILLIGRAM(S): 250 TABLET ORAL at 05:38

## 2021-10-07 RX ADMIN — OXYCODONE AND ACETAMINOPHEN 1 TABLET(S): 5; 325 TABLET ORAL at 10:37

## 2021-10-07 RX ADMIN — Medication 81 MILLIGRAM(S): at 11:32

## 2021-10-07 RX ADMIN — HEPARIN SODIUM 5000 UNIT(S): 5000 INJECTION INTRAVENOUS; SUBCUTANEOUS at 05:38

## 2021-10-07 RX ADMIN — Medication 25 MILLIGRAM(S): at 18:34

## 2021-10-07 RX ADMIN — OXYCODONE AND ACETAMINOPHEN 1 TABLET(S): 5; 325 TABLET ORAL at 19:22

## 2021-10-07 RX ADMIN — OXYCODONE AND ACETAMINOPHEN 1 TABLET(S): 5; 325 TABLET ORAL at 09:37

## 2021-10-07 RX ADMIN — CARBIDOPA AND LEVODOPA 1 TABLET(S): 25; 100 TABLET ORAL at 11:32

## 2021-10-07 RX ADMIN — Medication 25 MICROGRAM(S): at 05:38

## 2021-10-07 RX ADMIN — LACOSAMIDE 300 MILLIGRAM(S): 50 TABLET ORAL at 22:24

## 2021-10-07 RX ADMIN — HEPARIN SODIUM 5000 UNIT(S): 5000 INJECTION INTRAVENOUS; SUBCUTANEOUS at 15:15

## 2021-10-07 RX ADMIN — MEMANTINE HYDROCHLORIDE 10 MILLIGRAM(S): 10 TABLET ORAL at 22:24

## 2021-10-07 RX ADMIN — HEPARIN SODIUM 5000 UNIT(S): 5000 INJECTION INTRAVENOUS; SUBCUTANEOUS at 22:25

## 2021-10-07 RX ADMIN — Medication 40 MILLIGRAM(S): at 05:38

## 2021-10-07 RX ADMIN — DIVALPROEX SODIUM 500 MILLIGRAM(S): 500 TABLET, DELAYED RELEASE ORAL at 05:38

## 2021-10-07 NOTE — PROGRESS NOTE ADULT - ASSESSMENT
70yo male with hx of Seizure d/o, PD, presented to the ED due to unwitnessed fall at [facility] and noted to have seizure episodes en route to the ED    Seizure Disorder likely secondary to missed dose of medication  - unwitnessed fall at [place] then toxic clonic seizure in EMS on way to ER  - cont home meds  - repeat head CT this AM to make sure no delayed head bleed   - discussed with Dr. Teddy Mcclellan yesterday who agrees with plan   - Neurochecks Q4hrs  - Aspiration/Fall/Seizure precautions  - PT consult     Parkinson's Disease  - Continue Carbidopa/Levadopa     HTN  - Metoprolol    Hypothyroidism  - Synthroid    DVT ppx  -HSQ    Discussed with [employer], [phone]    70yo male with hx of Seizure d/o, PD, presented to the ED due to unwitnessed fall at [facility] and noted to have seizure episodes en route to the ED    Seizure Disorder likely secondary to missed dose of medication  - unwitnessed fall at [facility] then toxic clonic seizure in EMS on way to ER  - cont home meds  - repeat head CT this AM to make sure no delayed head bleed   - discussed with Dr. Teddy Mcclellan today who suggested MRI to rule out occult issue, will try for MRI tomorrow  - Neurochecks Q4hrs  - Aspiration/Fall/Seizure precautions  - PT consult     Parkinson's Disease  - Continue Carbidopa/Levadopa     HTN  - Metoprolol    Hypothyroidism  - Synthroid    DVT ppx  -HSQ    Discussed with [employer] [place], [phone]

## 2021-10-07 NOTE — PROGRESS NOTE ADULT - ASSESSMENT
Patient is a 71 year old man, SNF resident, admitted 10/5/21 yesterday. He fell in the bathroom and en route to the hospital had focal motor seizures. In the ER he also had focal motor seizures and given IV ativan, lacosamide, and valproic Acid (Depacon). He has a history of seizures for which he has been on lacosamide, valproic Acid DR, and mysoline. He did not receive the morning seizure medications. He states he was in the bathroom and reached out with his right hand and fell forward striking his head. He states has right-sided HA. He complains of feeling generally unwell. Neurological exam as above.    1) Continue anticonvulsants, lacosamide, valproic acid DR, and mysoline as prior to admission.  2) Magnesium level as above within normal range, 2.5  3) Repeat valproic acid level pending.  4) F/U CT Head no contrast to evaluate delayed hemorrhage. As above CT Head 10/7/21 as compared to CT Head 10/5/21 no significant interval change. Redemonstration right hemicraniectomy and right frontal jase holes. Multifocal encephalomalacia bilateral frontal and right anterior temporal lobes. No intracranial hemorrhage or edema. Ventricales similar in size and mild to moderately dilated. The left ventricular body and frontal horn persistently asymmetrically enlarge.  5) Continue Sinemet 25/100 qid

## 2021-10-08 ENCOUNTER — TRANSCRIPTION ENCOUNTER (OUTPATIENT)
Age: 71
End: 2021-10-08

## 2021-10-08 ENCOUNTER — APPOINTMENT (OUTPATIENT)
Dept: MRI IMAGING | Facility: HOSPITAL | Age: 71
End: 2021-10-08

## 2021-10-08 VITALS
DIASTOLIC BLOOD PRESSURE: 86 MMHG | OXYGEN SATURATION: 97 % | SYSTOLIC BLOOD PRESSURE: 161 MMHG | HEART RATE: 86 BPM | RESPIRATION RATE: 17 BRPM | TEMPERATURE: 98 F

## 2021-10-08 LAB
PHENOBARB SERPL-MCNC: <5 MG/L — LOW (ref 15–40)
PRIMIDONE FLD-MCNC: <2.5 MG/L — LOW (ref 5–12)
SARS-COV-2 RNA SPEC QL NAA+PROBE: SIGNIFICANT CHANGE UP
VALPROATE SERPL-MCNC: 65 UG/ML — SIGNIFICANT CHANGE UP (ref 50–100)

## 2021-10-08 PROCEDURE — 70551 MRI BRAIN STEM W/O DYE: CPT | Mod: 26

## 2021-10-08 PROCEDURE — 99239 HOSP IP/OBS DSCHRG MGMT >30: CPT

## 2021-10-08 RX ORDER — DIVALPROEX SODIUM 500 MG/1
4 TABLET, DELAYED RELEASE ORAL
Qty: 0 | Refills: 0 | DISCHARGE
Start: 2021-10-08

## 2021-10-08 RX ADMIN — Medication 25 MICROGRAM(S): at 05:23

## 2021-10-08 RX ADMIN — Medication 0.25 MILLIGRAM(S): at 05:23

## 2021-10-08 RX ADMIN — CARBIDOPA AND LEVODOPA 1 TABLET(S): 25; 100 TABLET ORAL at 05:23

## 2021-10-08 RX ADMIN — PRIMIDONE 50 MILLIGRAM(S): 250 TABLET ORAL at 05:23

## 2021-10-08 RX ADMIN — OXYCODONE AND ACETAMINOPHEN 1 TABLET(S): 5; 325 TABLET ORAL at 13:44

## 2021-10-08 RX ADMIN — OXYCODONE AND ACETAMINOPHEN 1 TABLET(S): 5; 325 TABLET ORAL at 05:53

## 2021-10-08 RX ADMIN — Medication 81 MILLIGRAM(S): at 12:35

## 2021-10-08 RX ADMIN — OXYCODONE AND ACETAMINOPHEN 1 TABLET(S): 5; 325 TABLET ORAL at 05:23

## 2021-10-08 RX ADMIN — LACOSAMIDE 200 MILLIGRAM(S): 50 TABLET ORAL at 08:32

## 2021-10-08 RX ADMIN — Medication 0.25 MILLIGRAM(S): at 17:48

## 2021-10-08 RX ADMIN — DIVALPROEX SODIUM 500 MILLIGRAM(S): 500 TABLET, DELAYED RELEASE ORAL at 13:27

## 2021-10-08 RX ADMIN — OXYCODONE AND ACETAMINOPHEN 1 TABLET(S): 5; 325 TABLET ORAL at 12:45

## 2021-10-08 RX ADMIN — CARBIDOPA AND LEVODOPA 1 TABLET(S): 25; 100 TABLET ORAL at 17:45

## 2021-10-08 RX ADMIN — Medication 25 MILLIGRAM(S): at 05:23

## 2021-10-08 RX ADMIN — Medication 25 MILLIGRAM(S): at 17:45

## 2021-10-08 RX ADMIN — HEPARIN SODIUM 5000 UNIT(S): 5000 INJECTION INTRAVENOUS; SUBCUTANEOUS at 13:27

## 2021-10-08 RX ADMIN — HEPARIN SODIUM 5000 UNIT(S): 5000 INJECTION INTRAVENOUS; SUBCUTANEOUS at 05:23

## 2021-10-08 RX ADMIN — CARBIDOPA AND LEVODOPA 1 TABLET(S): 25; 100 TABLET ORAL at 13:05

## 2021-10-08 RX ADMIN — PRIMIDONE 50 MILLIGRAM(S): 250 TABLET ORAL at 17:45

## 2021-10-08 RX ADMIN — ESCITALOPRAM OXALATE 20 MILLIGRAM(S): 10 TABLET, FILM COATED ORAL at 12:35

## 2021-10-08 RX ADMIN — Medication 40 MILLIGRAM(S): at 05:23

## 2021-10-08 RX ADMIN — DIVALPROEX SODIUM 500 MILLIGRAM(S): 500 TABLET, DELAYED RELEASE ORAL at 05:23

## 2021-10-08 NOTE — PROGRESS NOTE ADULT - ASSESSMENT
70yo male with hx of Seizure d/o, PD, presented to the ED due to unwitnessed fall at [facility] and noted to have seizure episodes en route to the ED    Seizure Disorder likely secondary to missed dose of medication  - unwitnessed fall at [place] then toxic clonic seizure in EMS on way to ER  - cont home meds  - repeat head CT t10/7/21 negative for occult bleeding  - discussed with outpatient neurologist Dr. Teddy Mcclellan who recommended MRI to rule out any new lesions   - Neurochecks Q4hrs  - Aspiration/Fall/Seizure precautions  - if MRI negative will likely d/c to [place] with rehab     Parkinson's Disease  - Continue Carbidopa/Levadopa     HTN  - Metoprolol    Hypothyroidism  - Synthroid    DVT ppx  -HSQ    Discussed with [employer] , [employer], [phone]

## 2021-10-08 NOTE — DISCHARGE NOTE PROVIDER - HOSPITAL COURSE
Hospital Course  HPI:  72yo male with hx of Seizure d/o, PD, presented to the ED due to unwitnessed fall at [facility] and noted to have seizure episodes en route to the ED. Pt is somnolent/sedated in the ED, unable to provide hx. Minimal Hx provided by RN, Minnie, at [facility]. Per Minnie, pt had an unwitness fall in the AM while in the bathroom. Unsure if there as LOC or head trauma. While en route to the ED, pt was noted to have sustained tonic clonic seizure in the ambulance for approximately 1 hr. Per ED, pt missed his seizure medications this AM. Pt was given multiple rounds of Ativan, Depakote and Vimpat.  (05 Oct 2021 18:21)      You were admitted for   You were diagnosed with   You were treated with   You were prescribed the following new medications:    You will need to follow up with your primary care physician.    Source of Infection:  Antibiotic / Last Day:    Palliative Care / Advanced Care Planning  Code Status:  Patient/Family agreeable to Hospice/Palliative (Y/N)?  Summary of Goals of Care Conversation:    Discharging Provider:  William Zavala MD  Contact Info: Cell [phone] - Please call with any questions or concerns.    Outpatient Provider:    Signout given to  SNF Provider:   Hospital Course  HPI:  72yo male with hx of Seizure d/o, PD, presented to the ED due to unwitnessed fall at [facility] and noted to have seizure episodes en route to the ED. Pt is somnolent/sedated in the ED, unable to provide hx. Minimal Hx provided by RN, Minnie, at [facility]. Per Minnie, pt had an unwitness fall in the AM while in the bathroom. Unsure if there as LOC or head trauma. While en route to the ED, pt was noted to have sustained tonic clonic seizure in the ambulance for approximately 1 hr. Per ED, pt missed his seizure medications this AM. Pt was given multiple rounds of Ativan, Depakote and Vimpat.  (05 Oct 2021 18:21)    You were admitted for seizures.  You were diagnosed with seizures secondary to missing a dose of medication.  You were restarted on your home medications and did not have anymore seizures in the hospital.    You will need to follow up with your primary care physician.    Discharging Provider:  William Zavala MD  Contact Info: Cell [phone] - Please call with any questions or concerns.    Signout given to  SNF Provider: Mari Morrissey - [phone]

## 2021-10-08 NOTE — PROGRESS NOTE ADULT - ASSESSMENT
Patient is a 71 year old man, SNF resident, admitted 10/5/21 yesterday. He fell in the bathroom and en route to the hospital had focal motor seizures. In the ER he also had focal motor seizures and given IV ativan, lacosamide, and valproic Acid (Depacon). He has a history of seizures for which he has been on lacosamide, valproic Acid DR, and mysoline. He did not receive the morning seizure medications. He states he was in the bathroom and reached out with his right hand and fell forward striking his head. He states has right-sided HA. He complains of feeling generally unwell. Neurological exam as above.    1) Continue anticonvulsants, lacosamide, valproic acid DR, and mysoline as prior to admission.  2) Magnesium level as above within normal range, 2.5  3) Repeat valproic acid level 10/7/21 as above 65 within therapeutic range.  4) F/U CT Head no contrast to evaluate delayed hemorrhage. CT Head 10/7/21 as compared to CT Head 10/5/21 no significant interval change. Redemonstration right hemicraniectomy and right frontal jase holes. Multifocal encephalomalacia bilateral frontal and right anterior temporal lobes. No intracranial hemorrhage or edema. Ventricales similar in size and mild to moderately dilated. The left ventricular body and frontal horn persistently asymmetrically enlarge.  5) Continue Sinemet 25/100 qid

## 2021-10-08 NOTE — DISCHARGE NOTE PROVIDER - NSDCMRMEDTOKEN_GEN_ALL_CORE_FT
acetaminophen 325 mg oral tablet: 2 tab(s) orally every 8 hours, As needed, Temp greater or equal to 38C (100.4F), Mild Pain (1 - 3)  ALPRAZolam 0.25 mg oral tablet: 1 tab(s) orally 2 times a day  aspirin 81 mg oral tablet, chewable: 1 tab(s) orally once a day  baclofen 10 mg oral tablet: orally once a day (at bedtime)  carbidopa-levodopa 25 mg-100 mg oral tablet: orally 4 times a day  divalproex sodium 125 mg oral delayed release capsule: 4 cap(s) orally 3 times a day  escitalopram 20 mg oral tablet: 1 tab(s) orally once a day  Flonase 50 mcg/inh nasal spray: 1 spray(s) nasal once a day  folic acid 1 mg oral tablet: 1 tab(s) orally once a day  furosemide 40 mg oral tablet: 1 tab(s) orally once a day  gabapentin 300 mg oral capsule: 1 cap(s) orally once a day (at bedtime)  memantine 10 mg oral tablet: orally once a day (at bedtime)  Metoprolol Tartrate 25 mg oral tablet: 1 tab(s) orally 2 times a day  mirtazapine 7.5 mg oral tablet: orally once a day (at bedtime)  omeprazole 20 mg oral delayed release capsule: 1 cap(s) orally once a day  primidone 50 mg oral tablet: 1 tab(s) orally 2 times a day  Senna 8.6 mg oral tablet: 2 tab(s) orally once a day (at bedtime)  Synthroid 25 mcg (0.025 mg) oral tablet: 1 tab(s) orally once a day  Tab-A-Randi oral tablet: 1 tab(s) orally once a day  Vimpat 150 mg oral tablet: 2 tab(s) orally once a day (at bedtime)  Vimpat 200 mg oral tablet: 1 tab(s) orally once a day  Vitamin B1 100 mg oral tablet: 1 tab(s) orally once a day  Vitamin D3 1000 intl units (25 mcg) oral tablet: orally once a day

## 2021-10-08 NOTE — DISCHARGE NOTE NURSING/CASE MANAGEMENT/SOCIAL WORK - PATIENT PORTAL LINK FT
You can access the FollowMyHealth Patient Portal offered by [facility] by registering at the following website: [email or web address]. By joining [facility]’s FollowMyHealth portal, you will also be able to view your health information using other applications (apps) compatible with our system.

## 2021-10-08 NOTE — DISCHARGE NOTE PROVIDER - NSDCCPCAREPLAN_GEN_ALL_CORE_FT
PRINCIPAL DISCHARGE DIAGNOSIS  Diagnosis: Seizure disorder  Assessment and Plan of Treatment:       SECONDARY DISCHARGE DIAGNOSES  Diagnosis: Accidental fall  Assessment and Plan of Treatment:     Diagnosis: H/O low back pain  Assessment and Plan of Treatment:     Diagnosis: Closed head injury  Assessment and Plan of Treatment:

## 2021-10-12 LAB
DESMETHYL LACOSAMIDE: <0.5 UG/ML — SIGNIFICANT CHANGE UP
LACOSAMIDE (VIMPAT) RESULT: <0.5 UG/ML — LOW (ref 1–10)

## 2021-10-13 ENCOUNTER — APPOINTMENT (OUTPATIENT)
Dept: MRI IMAGING | Facility: HOSPITAL | Age: 71
End: 2021-10-13

## 2021-10-26 PROCEDURE — 70551 MRI BRAIN STEM W/O DYE: CPT

## 2021-10-26 PROCEDURE — C9254: CPT

## 2021-10-26 PROCEDURE — 80188 ASSAY OF PRIMIDONE: CPT

## 2021-10-26 PROCEDURE — U0003: CPT

## 2021-10-26 PROCEDURE — 80048 BASIC METABOLIC PNL TOTAL CA: CPT

## 2021-10-26 PROCEDURE — 36415 COLL VENOUS BLD VENIPUNCTURE: CPT

## 2021-10-26 PROCEDURE — 72100 X-RAY EXAM L-S SPINE 2/3 VWS: CPT

## 2021-10-26 PROCEDURE — 80235 DRUG ASSAY LACOSAMIDE: CPT

## 2021-10-26 PROCEDURE — 85027 COMPLETE CBC AUTOMATED: CPT

## 2021-10-26 PROCEDURE — 70450 CT HEAD/BRAIN W/O DYE: CPT | Mod: MA

## 2021-10-26 PROCEDURE — 87635 SARS-COV-2 COVID-19 AMP PRB: CPT

## 2021-10-26 PROCEDURE — 97162 PT EVAL MOD COMPLEX 30 MIN: CPT

## 2021-10-26 PROCEDURE — 96374 THER/PROPH/DIAG INJ IV PUSH: CPT

## 2021-10-26 PROCEDURE — 84484 ASSAY OF TROPONIN QUANT: CPT

## 2021-10-26 PROCEDURE — 80184 ASSAY OF PHENOBARBITAL: CPT

## 2021-10-26 PROCEDURE — 97116 GAIT TRAINING THERAPY: CPT

## 2021-10-26 PROCEDURE — 80053 COMPREHEN METABOLIC PANEL: CPT

## 2021-10-26 PROCEDURE — 83735 ASSAY OF MAGNESIUM: CPT

## 2021-10-26 PROCEDURE — 86769 SARS-COV-2 COVID-19 ANTIBODY: CPT

## 2021-10-26 PROCEDURE — 80164 ASSAY DIPROPYLACETIC ACD TOT: CPT

## 2021-10-26 PROCEDURE — 93005 ELECTROCARDIOGRAM TRACING: CPT

## 2021-10-26 PROCEDURE — 99285 EMERGENCY DEPT VISIT HI MDM: CPT

## 2021-10-26 PROCEDURE — U0005: CPT

## 2021-10-26 PROCEDURE — 96376 TX/PRO/DX INJ SAME DRUG ADON: CPT

## 2021-10-26 PROCEDURE — 96375 TX/PRO/DX INJ NEW DRUG ADDON: CPT

## 2021-10-26 PROCEDURE — 85025 COMPLETE CBC W/AUTO DIFF WBC: CPT

## 2022-01-27 ENCOUNTER — EMERGENCY (EMERGENCY)
Facility: HOSPITAL | Age: 72
LOS: 1 days | Discharge: NURSING, REHAB OR LONG-TERM CARE | End: 2022-01-27
Attending: EMERGENCY MEDICINE | Admitting: EMERGENCY MEDICINE
Payer: MEDICARE

## 2022-01-27 VITALS
HEIGHT: 72 IN | HEART RATE: 60 BPM | DIASTOLIC BLOOD PRESSURE: 75 MMHG | SYSTOLIC BLOOD PRESSURE: 141 MMHG | RESPIRATION RATE: 16 BRPM | OXYGEN SATURATION: 96 % | WEIGHT: 169.98 LBS

## 2022-01-27 VITALS
SYSTOLIC BLOOD PRESSURE: 155 MMHG | HEART RATE: 76 BPM | TEMPERATURE: 98 F | RESPIRATION RATE: 16 BRPM | DIASTOLIC BLOOD PRESSURE: 86 MMHG | OXYGEN SATURATION: 98 %

## 2022-01-27 DIAGNOSIS — Z98.890 OTHER SPECIFIED POSTPROCEDURAL STATES: Chronic | ICD-10-CM

## 2022-01-27 LAB
ALBUMIN SERPL ELPH-MCNC: 2.5 G/DL — LOW (ref 3.3–5)
ALP SERPL-CCNC: 86 U/L — SIGNIFICANT CHANGE UP (ref 30–120)
ALT FLD-CCNC: <10 U/L DA — LOW (ref 10–60)
ANION GAP SERPL CALC-SCNC: 7 MMOL/L — SIGNIFICANT CHANGE UP (ref 5–17)
APPEARANCE UR: CLEAR — SIGNIFICANT CHANGE UP
APTT BLD: 34.5 SEC — SIGNIFICANT CHANGE UP (ref 27.5–35.5)
AST SERPL-CCNC: 24 U/L — SIGNIFICANT CHANGE UP (ref 10–40)
BASOPHILS # BLD AUTO: 0.03 K/UL — SIGNIFICANT CHANGE UP (ref 0–0.2)
BASOPHILS NFR BLD AUTO: 0.4 % — SIGNIFICANT CHANGE UP (ref 0–2)
BILIRUB SERPL-MCNC: 0.4 MG/DL — SIGNIFICANT CHANGE UP (ref 0.2–1.2)
BILIRUB UR-MCNC: NEGATIVE — SIGNIFICANT CHANGE UP
BUN SERPL-MCNC: 11 MG/DL — SIGNIFICANT CHANGE UP (ref 7–23)
CALCIUM SERPL-MCNC: 8.2 MG/DL — LOW (ref 8.4–10.5)
CHLORIDE SERPL-SCNC: 107 MMOL/L — SIGNIFICANT CHANGE UP (ref 96–108)
CO2 SERPL-SCNC: 28 MMOL/L — SIGNIFICANT CHANGE UP (ref 22–31)
COLOR SPEC: ABNORMAL
CREAT SERPL-MCNC: 0.59 MG/DL — SIGNIFICANT CHANGE UP (ref 0.5–1.3)
DIFF PNL FLD: ABNORMAL
EOSINOPHIL # BLD AUTO: 0.16 K/UL — SIGNIFICANT CHANGE UP (ref 0–0.5)
EOSINOPHIL NFR BLD AUTO: 2.3 % — SIGNIFICANT CHANGE UP (ref 0–6)
GLUCOSE SERPL-MCNC: 94 MG/DL — SIGNIFICANT CHANGE UP (ref 70–99)
GLUCOSE UR QL: NEGATIVE MG/DL — SIGNIFICANT CHANGE UP
HCT VFR BLD CALC: 39 % — SIGNIFICANT CHANGE UP (ref 39–50)
HGB BLD-MCNC: 12.2 G/DL — LOW (ref 13–17)
IMM GRANULOCYTES NFR BLD AUTO: 0.4 % — SIGNIFICANT CHANGE UP (ref 0–1.5)
INR BLD: 1.17 RATIO — HIGH (ref 0.88–1.16)
KETONES UR-MCNC: ABNORMAL
LACTATE SERPL-SCNC: 0.9 MMOL/L — SIGNIFICANT CHANGE UP (ref 0.7–2)
LEUKOCYTE ESTERASE UR-ACNC: ABNORMAL
LYMPHOCYTES # BLD AUTO: 2.29 K/UL — SIGNIFICANT CHANGE UP (ref 1–3.3)
LYMPHOCYTES # BLD AUTO: 32.5 % — SIGNIFICANT CHANGE UP (ref 13–44)
MCHC RBC-ENTMCNC: 27.7 PG — SIGNIFICANT CHANGE UP (ref 27–34)
MCHC RBC-ENTMCNC: 31.3 GM/DL — LOW (ref 32–36)
MCV RBC AUTO: 88.4 FL — SIGNIFICANT CHANGE UP (ref 80–100)
MONOCYTES # BLD AUTO: 0.96 K/UL — HIGH (ref 0–0.9)
MONOCYTES NFR BLD AUTO: 13.6 % — SIGNIFICANT CHANGE UP (ref 2–14)
NEUTROPHILS # BLD AUTO: 3.58 K/UL — SIGNIFICANT CHANGE UP (ref 1.8–7.4)
NEUTROPHILS NFR BLD AUTO: 50.8 % — SIGNIFICANT CHANGE UP (ref 43–77)
NITRITE UR-MCNC: NEGATIVE — SIGNIFICANT CHANGE UP
NRBC # BLD: 0 /100 WBCS — SIGNIFICANT CHANGE UP (ref 0–0)
PH UR: 5 — SIGNIFICANT CHANGE UP (ref 5–8)
PLATELET # BLD AUTO: 195 K/UL — SIGNIFICANT CHANGE UP (ref 150–400)
POTASSIUM SERPL-MCNC: 4.2 MMOL/L — SIGNIFICANT CHANGE UP (ref 3.5–5.3)
POTASSIUM SERPL-SCNC: 4.2 MMOL/L — SIGNIFICANT CHANGE UP (ref 3.5–5.3)
PROT SERPL-MCNC: 6.8 G/DL — SIGNIFICANT CHANGE UP (ref 6–8.3)
PROT UR-MCNC: 30 MG/DL
PROTHROM AB SERPL-ACNC: 14.1 SEC — HIGH (ref 10.6–13.6)
RBC # BLD: 4.41 M/UL — SIGNIFICANT CHANGE UP (ref 4.2–5.8)
RBC # FLD: 16.1 % — HIGH (ref 10.3–14.5)
SARS-COV-2 RNA SPEC QL NAA+PROBE: DETECTED
SODIUM SERPL-SCNC: 142 MMOL/L — SIGNIFICANT CHANGE UP (ref 135–145)
SP GR SPEC: 1.02 — SIGNIFICANT CHANGE UP (ref 1.01–1.02)
TROPONIN I, HIGH SENSITIVITY RESULT: 12.1 NG/L — SIGNIFICANT CHANGE UP
UROBILINOGEN FLD QL: 1 MG/DL
VALPROATE SERPL-MCNC: 76 UG/ML — SIGNIFICANT CHANGE UP (ref 50–100)
WBC # BLD: 7.05 K/UL — SIGNIFICANT CHANGE UP (ref 3.8–10.5)
WBC # FLD AUTO: 7.05 K/UL — SIGNIFICANT CHANGE UP (ref 3.8–10.5)

## 2022-01-27 PROCEDURE — 74177 CT ABD & PELVIS W/CONTRAST: CPT | Mod: 26,MA

## 2022-01-27 PROCEDURE — 71045 X-RAY EXAM CHEST 1 VIEW: CPT

## 2022-01-27 PROCEDURE — 99285 EMERGENCY DEPT VISIT HI MDM: CPT | Mod: 25

## 2022-01-27 PROCEDURE — 99285 EMERGENCY DEPT VISIT HI MDM: CPT | Mod: FS,CS

## 2022-01-27 PROCEDURE — 87635 SARS-COV-2 COVID-19 AMP PRB: CPT

## 2022-01-27 PROCEDURE — 87086 URINE CULTURE/COLONY COUNT: CPT

## 2022-01-27 PROCEDURE — 87040 BLOOD CULTURE FOR BACTERIA: CPT

## 2022-01-27 PROCEDURE — 70450 CT HEAD/BRAIN W/O DYE: CPT | Mod: 26,MA

## 2022-01-27 PROCEDURE — 80164 ASSAY DIPROPYLACETIC ACD TOT: CPT

## 2022-01-27 PROCEDURE — 93005 ELECTROCARDIOGRAM TRACING: CPT

## 2022-01-27 PROCEDURE — 85025 COMPLETE CBC W/AUTO DIFF WBC: CPT

## 2022-01-27 PROCEDURE — 85730 THROMBOPLASTIN TIME PARTIAL: CPT

## 2022-01-27 PROCEDURE — 81001 URINALYSIS AUTO W/SCOPE: CPT

## 2022-01-27 PROCEDURE — 93010 ELECTROCARDIOGRAM REPORT: CPT

## 2022-01-27 PROCEDURE — 71045 X-RAY EXAM CHEST 1 VIEW: CPT | Mod: 26

## 2022-01-27 PROCEDURE — 84484 ASSAY OF TROPONIN QUANT: CPT

## 2022-01-27 PROCEDURE — 96360 HYDRATION IV INFUSION INIT: CPT | Mod: XU

## 2022-01-27 PROCEDURE — 85610 PROTHROMBIN TIME: CPT

## 2022-01-27 PROCEDURE — 36415 COLL VENOUS BLD VENIPUNCTURE: CPT

## 2022-01-27 PROCEDURE — 80053 COMPREHEN METABOLIC PANEL: CPT

## 2022-01-27 PROCEDURE — 83605 ASSAY OF LACTIC ACID: CPT

## 2022-01-27 PROCEDURE — 74177 CT ABD & PELVIS W/CONTRAST: CPT | Mod: MA

## 2022-01-27 PROCEDURE — 70450 CT HEAD/BRAIN W/O DYE: CPT | Mod: MA

## 2022-01-27 RX ORDER — OMEPRAZOLE 10 MG/1
1 CAPSULE, DELAYED RELEASE ORAL
Qty: 0 | Refills: 0 | DISCHARGE

## 2022-01-27 RX ORDER — LACOSAMIDE 50 MG/1
1 TABLET ORAL
Qty: 0 | Refills: 0 | DISCHARGE

## 2022-01-27 RX ORDER — LOPERAMIDE HCL 2 MG
1 TABLET ORAL
Qty: 12 | Refills: 0
Start: 2022-01-27 | End: 2022-01-29

## 2022-01-27 RX ORDER — CARBIDOPA AND LEVODOPA 25; 100 MG/1; MG/1
2 TABLET ORAL
Qty: 0 | Refills: 0 | DISCHARGE

## 2022-01-27 RX ORDER — DOCUSATE SODIUM 100 MG
1 CAPSULE ORAL
Qty: 0 | Refills: 0 | DISCHARGE

## 2022-01-27 RX ORDER — ESCITALOPRAM OXALATE 10 MG/1
1 TABLET, FILM COATED ORAL
Qty: 0 | Refills: 0 | DISCHARGE

## 2022-01-27 RX ORDER — LORATADINE 10 MG/1
1 TABLET ORAL
Qty: 0 | Refills: 0 | DISCHARGE

## 2022-01-27 RX ORDER — SIMETHICONE 80 MG/1
1 TABLET, CHEWABLE ORAL
Qty: 0 | Refills: 0 | DISCHARGE

## 2022-01-27 RX ORDER — SODIUM CHLORIDE 9 MG/ML
500 INJECTION INTRAMUSCULAR; INTRAVENOUS; SUBCUTANEOUS ONCE
Refills: 0 | Status: DISCONTINUED | OUTPATIENT
Start: 2022-01-27 | End: 2022-01-27

## 2022-01-27 RX ORDER — CHOLECALCIFEROL (VITAMIN D3) 125 MCG
1 CAPSULE ORAL
Qty: 0 | Refills: 0 | DISCHARGE

## 2022-01-27 RX ORDER — FAMOTIDINE 10 MG/ML
1 INJECTION INTRAVENOUS
Qty: 0 | Refills: 0 | DISCHARGE

## 2022-01-27 RX ORDER — FOLIC ACID 0.8 MG
1 TABLET ORAL
Qty: 0 | Refills: 0 | DISCHARGE

## 2022-01-27 RX ORDER — MAGNESIUM HYDROXIDE 400 MG/1
30 TABLET, CHEWABLE ORAL
Qty: 0 | Refills: 0 | DISCHARGE

## 2022-01-27 RX ORDER — LEVOTHYROXINE SODIUM 125 MCG
1 TABLET ORAL
Qty: 0 | Refills: 0 | DISCHARGE

## 2022-01-27 RX ORDER — LANOLIN ALCOHOL/MO/W.PET/CERES
2 CREAM (GRAM) TOPICAL
Qty: 0 | Refills: 0 | DISCHARGE

## 2022-01-27 RX ORDER — THIAMINE MONONITRATE (VIT B1) 100 MG
1 TABLET ORAL
Qty: 0 | Refills: 0 | DISCHARGE

## 2022-01-27 RX ORDER — FLUTICASONE PROPIONATE 50 MCG
1 SPRAY, SUSPENSION NASAL
Qty: 0 | Refills: 0 | DISCHARGE

## 2022-01-27 RX ORDER — LACOSAMIDE 50 MG/1
2 TABLET ORAL
Qty: 0 | Refills: 0 | DISCHARGE

## 2022-01-27 RX ORDER — SODIUM CHLORIDE 9 MG/ML
2 INJECTION INTRAMUSCULAR; INTRAVENOUS; SUBCUTANEOUS
Qty: 0 | Refills: 0 | DISCHARGE

## 2022-01-27 RX ORDER — MEMANTINE HYDROCHLORIDE 10 MG/1
0 TABLET ORAL
Qty: 0 | Refills: 0 | DISCHARGE

## 2022-01-27 RX ORDER — LOSARTAN POTASSIUM 100 MG/1
1 TABLET, FILM COATED ORAL
Qty: 0 | Refills: 0 | DISCHARGE

## 2022-01-27 RX ORDER — HYDROCORTISONE 1 %
1 OINTMENT (GRAM) TOPICAL
Qty: 10 | Refills: 0
Start: 2022-01-27 | End: 2022-01-31

## 2022-01-27 RX ORDER — ASCORBIC ACID 60 MG
1 TABLET,CHEWABLE ORAL
Qty: 0 | Refills: 0 | DISCHARGE

## 2022-01-27 RX ORDER — SODIUM CHLORIDE 9 MG/ML
1000 INJECTION INTRAMUSCULAR; INTRAVENOUS; SUBCUTANEOUS ONCE
Refills: 0 | Status: COMPLETED | OUTPATIENT
Start: 2022-01-27 | End: 2022-01-27

## 2022-01-27 RX ORDER — ENTACAPONE 200 MG/1
1 TABLET, FILM COATED ORAL
Qty: 0 | Refills: 0 | DISCHARGE

## 2022-01-27 RX ORDER — MIRTAZAPINE 45 MG/1
0 TABLET, ORALLY DISINTEGRATING ORAL
Qty: 0 | Refills: 0 | DISCHARGE

## 2022-01-27 RX ORDER — QUETIAPINE FUMARATE 200 MG/1
1 TABLET, FILM COATED ORAL
Qty: 0 | Refills: 0 | DISCHARGE

## 2022-01-27 RX ORDER — SENNA PLUS 8.6 MG/1
2 TABLET ORAL
Qty: 0 | Refills: 0 | DISCHARGE

## 2022-01-27 RX ORDER — ZINC SULFATE TAB 220 MG (50 MG ZINC EQUIVALENT) 220 (50 ZN) MG
1 TAB ORAL
Qty: 0 | Refills: 0 | DISCHARGE

## 2022-01-27 RX ORDER — BACLOFEN 100 %
0 POWDER (GRAM) MISCELLANEOUS
Qty: 0 | Refills: 0 | DISCHARGE

## 2022-01-27 RX ORDER — CHOLECALCIFEROL (VITAMIN D3) 125 MCG
0 CAPSULE ORAL
Qty: 0 | Refills: 0 | DISCHARGE

## 2022-01-27 RX ORDER — CARBIDOPA AND LEVODOPA 25; 100 MG/1; MG/1
0 TABLET ORAL
Qty: 0 | Refills: 0 | DISCHARGE

## 2022-01-27 RX ORDER — OXYCODONE HYDROCHLORIDE 5 MG/1
1 TABLET ORAL
Qty: 0 | Refills: 0 | DISCHARGE

## 2022-01-27 RX ADMIN — SODIUM CHLORIDE 1000 MILLILITER(S): 9 INJECTION INTRAMUSCULAR; INTRAVENOUS; SUBCUTANEOUS at 17:00

## 2022-01-27 RX ADMIN — SODIUM CHLORIDE 1000 MILLILITER(S): 9 INJECTION INTRAMUSCULAR; INTRAVENOUS; SUBCUTANEOUS at 15:35

## 2022-01-27 NOTE — ED PROVIDER NOTE - CLINICAL SUMMARY MEDICAL DECISION MAKING FREE TEXT BOX
73 y/o M with hx of dementia, anxiety, seizures, TBI, HTN, parkinsons BIBA from [facility] assisted living for evaluation of altered mental status x 2 days. States pt with increasing confusion since yesterday. Pt states that he feels fine. Unable to obtain further info from pt due to dementia. PE: as above A/P: will get labs, UA, cultures, CT head, cxr, covid swab, IVF, reassess

## 2022-01-27 NOTE — CONSULT NOTE ADULT - ASSESSMENT
AMS  H/o TBI  Had craniotomies  H/o seizure on Depakote and Vimpat.  Dementia  Limited but non focal exam.  No signs of CVA.  No seizure reported.  CT head - No acute pathology. Right-sided craniotomy and craniotomy post surgical changes. Encephalomalacia and gliosis within the right frontotemporal lobes. Additional areas of encephalomalacia and gliosis within the left cerebral hemisphere.  Continue prior meds.  Neurologically pt could be discharged.  F/up with his neurologist.  Could be brought back to ED if any further symptoms.  D/w Dr. Montanez and ED PA.

## 2022-01-27 NOTE — ED PROVIDER NOTE - CARE PROVIDER_API CALL
Neda Calvo (MD)  Family Medicine  [address] [place] [address]  Phone: [phone]  Fax: [phone]  Follow Up Time: 1-3 Days

## 2022-01-27 NOTE — ED PROVIDER NOTE - NSFOLLOWUPINSTRUCTIONS_ED_ALL_ED_FT
Follow up with your PMD. Drink plenty of fluids. Take imodium and use anusol as prescribed. If having worsening of symptoms or other related symptoms, RETURN TO THE ER IMMEDIATELY.

## 2022-01-27 NOTE — ED ADULT NURSE NOTE - NURSING NEURO LEVEL OF CONSCIOUSNESS
Detail Level: Detailed
Photo Preface (Leave Blank If You Do Not Want): Photographs were obtained today
alert and awake

## 2022-01-27 NOTE — ED ADULT NURSE NOTE - NSIMPLEMENTINTERV_GEN_ALL_ED
Implemented All Fall Risk Interventions:  [place] to call system. Call bell, personal items and telephone within reach. Instruct patient to call for assistance. Room bathroom lighting operational. Non-slip footwear when patient is off stretcher. Physically safe environment: no spills, clutter or unnecessary equipment. Stretcher in lowest position, wheels locked, appropriate side rails in place. Provide visual cue, wrist band, yellow gown, etc. Monitor gait and stability. Monitor for mental status changes and reorient to person, place, and time. Review medications for side effects contributing to fall risk. Reinforce activity limits and safety measures with patient and family.

## 2022-01-27 NOTE — ED ADULT NURSE REASSESSMENT NOTE - NS ED NURSE REASSESS COMMENT FT1
small amount of blood noted smeared on pad from rectum. Naa DOW aware and confirmed with Assisted Living that the patient has occaional bleeding hemorrhoid.

## 2022-01-27 NOTE — ED PROVIDER NOTE - PROGRESS NOTE DETAILS
Jamey DONAHUE for ED attending, Dr. Montanez: 71 y/o M w/ PMHx of parkinson's disease, dementia, HTN, TBI, seizures, CARMEN presents to ED BIBA from [facility] for increasing AMS since yesterday. Pt reports his head and body "were torn apart". Pt is poor historian, unable to obtain comprehensive history. On exam: vital signs normal, afebrile and in no distress, scalp large post surgical skull defect noted, no acute appearing bruising or swelling, PERRL EOMI, nec I supple nontender, heart and lungs normal, abd soft nontender, extremities from no edema, neuro awake confused oriented to person only , tremulous generalized weakness, no focal deficits. Impression is dementia pt w/ confusion unclear how much of this is new. Plan labs CT and consider neurology evaluation Jamey DONAHUE for ED attending, Dr. Montanez: 71 y/o M w/ PMHx of parkinson's disease, dementia, HTN, TBI, seizures, CARMEN presents to ED BIBA from [facility] [place] for increasing AMS since yesterday. Pt reports his head and body "were torn apart". Pt is poor historian, unable to obtain comprehensive history. On exam: vital signs normal, afebrile and in no distress, scalp large post surgical skull defect noted, no acute appearing bruising or swelling, PERRL EOMI, neck is supple nontender, heart and lungs normal, abd soft nontender, extremities full range of motion intact, no edema, neuro awake confused oriented to person only , tremulous, generalized weakness, no focal deficits. Impression is dementia pt w/ confusion unclear how much of this is new. Plan labs CT and consider neurology evaluation Spoke with health care proxy, Corinne and also RN at [facility], was advised that pt has had diarrhea for the past 2 days and is causing his hemorrhoids to bleed, was tested positive for covid on 01/13. Pt was seen by Dr. Calvo yesterday and prescribed imodium and anusol. Pt was seen by neurologist, Dr. Chen who cleared pt to be discharged. Informed health care proxy Corinne about results and agreed with plan for discharge back to facility.

## 2022-01-27 NOTE — ED PROVIDER NOTE - OBJECTIVE STATEMENT
71 y/o M with hx of dementia, anxiety, seizures, TBI, HTN, parkinsons BIBA from [facility] assisted living for evaluation of altered mental status x 2 days. States pt with increased confusion since yesterday. Pt states that he feels fine. Unable to obtain further info from pt due to dementia.

## 2022-01-27 NOTE — ED PROVIDER NOTE - CARE PLAN
Principal Discharge DX:	2019 novel coronavirus disease (COVID-19)  Secondary Diagnosis:	Altered mental status   1 Principal Discharge DX:	2019 novel coronavirus disease (COVID-19)  Secondary Diagnosis:	Altered mental status  Secondary Diagnosis:	Diarrhea

## 2022-01-27 NOTE — CONSULT NOTE ADULT - SUBJECTIVE AND OBJECTIVE BOX
Patient is a 72y old  Male who presents with a chief complaint of     HPI:    draft only    PAST MEDICAL & SURGICAL HISTORY:  HTN (hypertension)    TBI (traumatic brain injury)    Seizure    CARMEN (generalized anxiety disorder)    Dementia    Parkinsons disease    Status post craniectomy        MEDICATIONS  (STANDING):  sodium chloride 0.9% Bolus 1000 milliLiter(s) IV Bolus once    MEDICATIONS  (PRN):      Allergies    Keppra (Unknown)    Intolerances        SOCIAL HISTORY:    No h/o Smoking.   No h/o alcohol use.  Ex Smoker. Quite in past.  Pt smokes.  Pt does drink socially.  Pt drinks alcohol heavily.    FAMILY HISTORY:  Family history of rectal cancer (Father)        REVIEW OF SYSTEMS:    CONSTITUTIONAL: No fever  EYES: No eye pain,   ENMT:  No sinus or throat pain  NECK: No pain or stiffness  RESPIRATORY: No cough, No hemoptysis; No shortness of breath  CARDIOVASCULAR: No acute chest pain, palpitations,  or leg swelling  GASTROINTESTINAL: No abdominal pain. No nausea, vomiting, or hematemesis;  No melena or hematochezia.  GENITOURINARY: No  hematuria, or incontinence  MUSCULOSKELETAL: No joint swelling; No extremity pain  SKIN: No itching, rashes, or lesions   LYMPH NODES: No enlarged glands  NEUROLOGICAL: No headaches, memory loss,   PSYCHIATRIC: No depression, anxiety, mood swings, or difficulty sleeping  ENDOCRINE: No heat or cold intolerance;   HEME/LYMPH: No easy bruising, or bleeding gums  Allergy/Immunology. No medication allergy. No seasonal allergies.    PHYSICAL EXAM:  Vital Signs Last 24 Hrs  T(F): 98.6 (22 @ 14:49)  HR: 63 (22 @ 14:49)  BP: 153/71 (22 @ 14:49)  RR: 18 (22 @ 14:49)    GENERAL: NAD, well-groomed, well-developed  HEAD:  Atraumatic, Normocephalic  EYES: EOMI, PERRLA, conjunctiva and sclera clear  NECK: Supple, No JVD, thyroid non-palpable    On Neurological Examination:    Mental Status - Pt is alert, awake, oriented X3. Higher functions are intact. Pt. does have mild poor cognition. Follows commands well and able to answer questions appropriately.    Speech -  Normal. Slurred. Pt has no aphasia.    Cranial Nerves - Pupils 3 mm equal and reactive to light, extraocular eye movements intact. Pt has no visual field deficit.  Pt has no right left facial asymmetry. Tongue - is in midline.    Motor Exam - 4 plus/5 all over, No drift. No shaking or tremors.  Muscle tone - is normal all over. Moves all extremities equally. No asymmetry is seen.      Sensory Exam - Pin prick, temperature, joint position and vibration are intact on either side. Pt withdraws all extremities equally on stimulation. No asymmetry seen. No complaints of tingling, numbness.    Gait - Able to stand and walk unassisted. Pt is able to stand up with holding my hands and is able to walk for few feet around the bed. Not falling to either side.    Deep tendon Reflexes - 2 plus all over.    Coordination - Fine finger movements are normal on both sides. Finger to nose is also normal on both sides.       Romberg - Negative.    Neck Supple -  Yes.    LABS:                        12.2   7.05  )-----------( 195      ( 2022 14:36 )             39.0         142  |  107  |  11  ----------------------------<  94  4.2   |  28  |  0.59    Ca    8.2<L>      2022 14:36    TPro  6.8  /  Alb  2.5<L>  /  TBili  0.4  /  DBili  x   /  AST  24  /  ALT  <10<L>  /  AlkPhos  86      PT/INR - ( 2022 14:36 )   PT: 14.1 sec;   INR: 1.17 ratio         PTT - ( 2022 14:36 )  PTT:34.5 sec  Urinalysis Basic - ( 2022 14:36 )    Color: Kemi / Appearance: Clear / S.020 / pH: x  Gluc: x / Ketone: Small  / Bili: Negative / Urobili: 1 mg/dL   Blood: x / Protein: 30 mg/dL / Nitrite: Negative   Leuk Esterase: Trace / RBC: 0-2 /HPF / WBC 0-2   Sq Epi: x / Non Sq Epi: Occasional / Bacteria: Occasional        RADIOLOGY & ADDITIONAL STUDIES:       Patient is a 72y old  Male who presents with a chief complaint of AMS    HPI: 71 y/o M with hx of dementia, anxiety, seizures, TBI, HTN, Parkinson's disease BIBA from [facility] for evaluation of altered mental status x 2 days. States pt with increased confusion since yesterday. Pt states that he feels fine. Unable to obtain further info from pt due to dementia.  No facial asymmetry  No lateralized weakness.  No seizure reported.  H/o craniotomy sec to SDH is past.  CT head done in ED - No acute pathology    PAST MEDICAL & SURGICAL HISTORY:    HTN (hypertension)    TBI (traumatic brain injury)    Seizure    CARMEN (generalized anxiety disorder)    Dementia    Parkinson disease    Status post craniectomy    Home Medications:    acetaminophen 325 mg oral tablet: 2 tab(s) orally every 8 hours, As needed, Temp greater or equal to 38C (100.4F), Mild Pain (1 - 3) (05 Oct 2021 18:13)  ALPRAZolam 0.25 mg oral tablet: 1 tab(s) orally 2 times a day (05 Oct 2021 18:13)  aspirin 81 mg oral tablet, chewable: 1 tab(s) orally once a day (05 Oct 2021 18:13)  baclofen 10 mg oral tablet: orally once a day (at bedtime) (05 Oct 2021 18:13)  carbidopa-levodopa 25 mg-100 mg oral tablet: orally 4 times a day (05 Oct 2021 18:13)  divalproex sodium 125 mg oral delayed release capsule: 4 cap(s) orally 3 times a day (08 Oct 2021 10:15)  escitalopram 20 mg oral tablet: 1 tab(s) orally once a day (05 Oct 2021 18:13)  Flonase 50 mcg/inh nasal spray: 1 spray(s) nasal once a day (05 Oct 2021 18:13)  folic acid 1 mg oral tablet: 1 tab(s) orally once a day (05 Oct 2021 18:13)  furosemide 40 mg oral tablet: 1 tab(s) orally once a day (05 Oct 2021 18:13)  gabapentin 300 mg oral capsule: 1 cap(s) orally once a day (at bedtime) (05 Oct 2021 18:13)  memantine 10 mg oral tablet: orally once a day (at bedtime) (05 Oct 2021 18:13)  Metoprolol Tartrate 25 mg oral tablet: 1 tab(s) orally 2 times a day (05 Oct 2021 18:13)  mirtazapine 7.5 mg oral tablet: orally once a day (at bedtime) (05 Oct 2021 18:13)  omeprazole 20 mg oral delayed release capsule: 1 cap(s) orally once a day (05 Oct 2021 18:13)  primidone 50 mg oral tablet: 1 tab(s) orally 2 times a day (05 Oct 2021 18:13)  Senna 8.6 mg oral tablet: 2 tab(s) orally once a day (at bedtime) (05 Oct 2021 18:13)  Synthroid 25 mcg (0.025 mg) oral tablet: 1 tab(s) orally once a day (05 Oct 2021 18:13)  Tab-A-Randi oral tablet: 1 tab(s) orally once a day (05 Oct 2021 18:13)  Vimpat 150 mg oral tablet: 2 tab(s) orally once a day (at bedtime) (05 Oct 2021 18:13)  Vimpat 200 mg oral tablet: 1 tab(s) orally once a day (05 Oct 2021 18:13)  Vitamin B1 100 mg oral tablet: 1 tab(s) orally once a day (05 Oct 2021 18:13)  Vitamin D3 1000 intl units (25 mcg) oral tablet: orally once a day (05 Oct 2021 18:13)    MEDICATIONS  (STANDING):    sodium chloride 0.9% Bolus 1000 milliLiter(s) IV Bolus once    MEDICATIONS  (PRN):      Allergies    Keppra (Unknown)    SOCIAL HISTORY:    No h/o Smoking.   No h/o alcohol use.    FAMILY HISTORY: Family history of rectal cancer (Father)    REVIEW OF SYSTEMS: UTO sec to dementia    PHYSICAL EXAM:  Vital Signs Last 24 Hrs  T(F): 98.6 (22 @ 14:49)  HR: 63 (22 @ 14:49)  BP: 153/71 (22 @ 14:49)  RR: 18 (22 @ 14:49)    GENERAL: NAD, well-groomed, well-developed  HEAD:  Atraumatic, Normocephalic  EYES: EOMI, PERRLA, conjunctiva and sclera clear  NECK: Supple, No JVD    On Neurological Examination:    Mental Status - Pt is alert, awake, Poor cognition. Follows commands. Able to tell his . 2+2 is 4, 4+4 is 8, unable to tell 100-7    Speech -  Normal. No aphasia.    Cranial Nerves - Pupils 3 mm equal and reactive to light. No facial asymmetry. Tongue - is in midline.    Motor Exam - 4/5 all over, No drift. No shaking or tremors.     Sensory Exam - Pt withdraws all extremities equally on stimulation.     Gait - H/o limited mobility.    Deep tendon Reflexes - 2 plus all over.    Coordination - No asymmtery    Neck Supple -  Yes.    LABS:                        12.2   7.05  )-----------( 195      ( 2022 14:36 )             39.0         142  |  107  |  11  ----------------------------<  94  4.2   |  28  |  0.59    Ca    8.2<L>      2022 14:36    TPro  6.8  /  Alb  2.5<L>  /  TBili  0.4  /  DBili  x   /  AST  24  /  ALT  <10<L>  /  AlkPhos  86      PT/INR - ( 2022 14:36 )   PT: 14.1 sec;   INR: 1.17 ratio         PTT - ( 2022 14:36 )  PTT:34.5 sec  Urinalysis Basic - ( 2022 14:36 )    Color: Kemi / Appearance: Clear / S.020 / pH: x  Gluc: x / Ketone: Small  / Bili: Negative / Urobili: 1 mg/dL   Blood: x / Protein: 30 mg/dL / Nitrite: Negative   Leuk Esterase: Trace / RBC: 0-2 /HPF / WBC 0-2   Sq Epi: x / Non Sq Epi: Occasional / Bacteria: Occasional    RADIOLOGY & ADDITIONAL STUDIES:    < from: CT Head No Cont (22 @ 14:48) >  ACC: [id number] EXAM:  CT BRAIN                          PROCEDURE DATE:  2022          INTERPRETATION:  .    CLINICAL INFORMATION: Confusion.    TECHNIQUE: Multiple axial CT images of the head were obtained without   contrast. Sagittal and coronal reconstructed images were acquired from   the source data.    COMPARISON: Prior brain MRI study dated 10/8/2021.    FINDINGS: Right-sided craniotomy and craniotomy post surgical changes are   again appreciated. There is encephalomalacia and gliosis are again seen   within the right frontotemporal lobes.    Additional areas of encephalomalacia and gliosis within the left cerebral   hemisphere appear unchanged.    Dilatation of the lateral and third ventricles also appears unchanged.   The fourth ventricle and cerebral aqueduct appear relatively normal in   caliber.    There is no acute intracranial hemorrhage, mass effect, shift of the   midline structures, herniation, or abnormal extra axial fluid collection.    Polypoidal mucosal thickening is seen within the alveolar recess of the   left maxillary sinus. The mastoid air cells are clear. The orbits appear   normal.    IMPRESSION: No acute intracranial findings.    Unchanged chronic appearing findings, as discussed.    --- End of Report ---    KELLY JENKINS MD; Attending Radiologist  This document has been electronically signed. 2022  2:55PM    < end of copied text >

## 2022-01-27 NOTE — ED ADULT NURSE NOTE - NSICDXPASTMEDICALHX_GEN_ALL_CORE_FT
PAST MEDICAL HISTORY:  Dementia     CARMEN (generalized anxiety disorder)     HTN (hypertension)     Parkinsons disease     Seizure     TBI (traumatic brain injury) subdural hematoma

## 2022-01-27 NOTE — ED ADULT NURSE NOTE - OBJECTIVE STATEMENT
Patient sent from [facility] for evaluation of increasing confusion. Patient with history of dementia and craniotomy for subdural hematoma. Presents alert to person and knows he is in the hospital but is unsure why and confused to time. Patient with protective helmet in place and is noted to have deformity with skull depressed area to right parietal region from his surgery. Patient moves all extremities equally and without deficit.

## 2022-01-27 NOTE — ED ADULT NURSE REASSESSMENT NOTE - NS ED NURSE REASSESS COMMENT FT1
BERYL Chandra spoke with [facility] nursing supervisor regarding patients covid status and plan to return to the facility. Nursing supervisor confirmed patient has been covid+ on 1/13/22 and will return to his private room at the facility.

## 2022-01-27 NOTE — ED PROVIDER NOTE - PATIENT PORTAL LINK FT
You can access the FollowMyHealth Patient Portal offered by [facility] by registering at the following website: [email or web address]. By joining [email or web address]’s FollowMyHealth portal, you will also be able to view your health information using other applications (apps) compatible with our system.

## 2022-01-30 LAB
CULTURE RESULTS: NO GROWTH — SIGNIFICANT CHANGE UP
SPECIMEN SOURCE: SIGNIFICANT CHANGE UP

## 2022-02-01 LAB
CULTURE RESULTS: SIGNIFICANT CHANGE UP
CULTURE RESULTS: SIGNIFICANT CHANGE UP
SPECIMEN SOURCE: SIGNIFICANT CHANGE UP
SPECIMEN SOURCE: SIGNIFICANT CHANGE UP

## 2022-05-12 NOTE — PATIENT PROFILE ADULT - NSPROPTRIGHTNOTIFY_GEN_A_NUR
[Cough] : cough [Sputum] : sputum [SOB on Exertion] : sob on exertion [Negative] : Endocrine [Arthralgias] : arthralgias declines

## 2022-07-04 NOTE — DISCHARGE NOTE NURSING/CASE MANAGEMENT/SOCIAL WORK - MODE OF TRANSPORTATION
Ambulance
Alert-The patient is alert, awake and responds to voice. The patient is oriented to time, place, and person. The triage nurse is able to obtain subjective information.

## 2022-12-07 NOTE — DIETITIAN INITIAL EVALUATION ADULT. - REASON FOR ADMISSION
December 13, 2022    Tea Ring  [id number] [place] [id number]             [facility] [address] [place] [address] Dear Ms. Ring:    I am writing from the Outpatient Complex Care Management Department at [facility].  I received a referral from  to contact you or your caregiver regarding any needs you may have. I have attempted to contact you or your cargeiver by phone two times unsuccessfully.  Please contact the Outpatient Complex Care Management Department at [phone] if you would like to discuss your needs.      Sincerely,       Evelia Layton, JUAN JOSEW            right ankle fracture

## 2023-02-03 NOTE — ED ADULT NURSE NOTE - CAS TRG GENERAL NORM CIRC DET
Addended by: Jed Pereira on: 2/3/2023 01:43 PM     Modules accepted: Orders Strong peripheral pulses

## 2023-03-28 NOTE — PATIENT PROFILE ADULT - NSPROIMPLANTSMEDDEV_GEN_A_NUR
Patient received samples of Jardiance 10mg (four boxes of 7 tablets each) with lot number [id number] and expiration of 1/2025. He also received a $10 copay card- his current copay is $30.     Charmaine Angela, AndersD     None

## 2023-07-20 NOTE — PATIENT PROFILE ADULT - FUNCTIONAL SCREEN CURRENT LEVEL: COMMUNICATION, MLM
0 = understands/communicates without difficulty Elevate your foot, use the ace wrap and shoe that was applied in the ER. Follow up with a podiatrist, you can see Dr. Ricci Rivas . [address] [facility] [address]. Advance activity as tolerated.  Continue all previously prescribed medications as directed.  Follow up with your primary care physician in 48-72 hours- bring copies of your results.  Return to the ER for worsening or persistent symptoms, and/or ANY NEW OR CONCERNING SYMPTOMS. THIS INCLUDES BUT IS NOT LIMITED TO NUMBNESS, WEAKNESS, SEVERE PAIN OR FOR ANY OTHER SYMPTOMS THAT CONCERN YOU. If you have issues obtaining follow up, please call: [phone] to obtain a doctor or specialist who takes your insurance in your area.  You may call [phone] to make an appointment with the internal medicine clinic.

## 2023-10-09 NOTE — DISCHARGE NOTE NURSING/CASE MANAGEMENT/SOCIAL WORK - NSTRANSFERBELONGINGSDISPO_GEN_A_NUR
Anesthesia Evaluation     NPO Solid Status: > 8 hours             Airway   Mallampati: I  TM distance: >3 FB  Neck ROM: full  Dental - normal exam     Pulmonary    Cardiovascular     (+) dysrhythmias Atrial Fib, hyperlipidemia      Neuro/Psych  GI/Hepatic/Renal/Endo    (+) GI bleeding , liver disease cirrhosis    Musculoskeletal     Abdominal    Substance History   (+) alcohol use     OB/GYN          Other                        Anesthesia Plan    ASA 3     MAC     intravenous induction     Anesthetic plan, risks, benefits, and alternatives have been provided, discussed and informed consent has been obtained with: patient.      CODE STATUS:          not applicable

## 2024-03-29 NOTE — ED PROVIDER NOTE - NS ED MD DISPO DISCHARGE CCDA
Protocol For Photochemotherapy For Severe Photoresponsive Dermatoses: Petrolatum And Nbuvb: The patient received Photochemotherapy for severe photoresponsive dermatoses: Petrolatum and NBUVB requiring at least 4 to 8 hours of care under direct physician supervision. Render Post-Care In The Note: no Protocol For Broad Band Uvb: The patient received Broad Band UVB. Skin Type: V Comments On Previous Treatment: Patient reports no sensation of burning with previous treatment. \\n\\nPatient has reached max dosage- Holding at 3000 mJ Treatment Number: [id number] Protocol For Photochemotherapy: Mineral Oil And Broad Band Uvb: The patient received Photochemotherapy: Mineral Oil and Broad Band UVB. Protocol For Uva1: The patient received UVA1. Protocol For Photochemotherapy For Severe Photoresponsive Dermatoses: Puva: The patient received Photochemotherapy for severe photoresponsive dermatoses: PUVA requiring at least 4 to 8 hours of care under direct physician supervision. Protocol For Bath Puva: The patient received Bath PUVA. Total Body Energy: 3000mj Name Of Supervising Technician: Kasie SANABRIA MA Protocol For Photochemotherapy: Tar And Nbuvb (Goeckerman Treatment): The patient received Photochemotherapy: Tar and NBUVB (Goeckerman treatment). Protocol For Nbuvb: Hands/Feet: The patient received NBUVB. Protocol For Photochemotherapy For Severe Photoresponsive Dermatoses: Tar And Broad Band Uvb (Goeckerman Treatment): The patient received Photochemotherapy for severe photoresponsive dermatoses: Tar and Broad Band UVB (Goeckerman treatment) requiring at least 4 to 8 hours of care under direct physician supervision. Protocol For Photochemotherapy: Mineral Oil And Nbuvb: The patient received Photochemotherapy: Mineral Oil and NBUVB (mineral oil applied to all lesions prior to phototherapy). Post-Care Instructions: I reviewed with the patient in detail post-care instructions. Patient is to wear sun protection. Patients may expect sunburn like redness, discomfort and scabbing. Protocol For Uva: The patient received UVA. Protocol For Nb Uva: The patient received NB UVA. Protocol: NBUVB Protocol For Photochemotherapy: Tar And Broad Band Uvb (Goeckerman Treatment): The patient received Photochemotherapy: Tar and Broad Band UVB (Goeckerman treatment). Protocol For Protocol For Photochemotherapy For Severe Photoresponsive Dermatoses: Bath Puva: The patient received Photochemotherapy for severe photoresponsive dermatoses: Bath PUVA requiring at least 4 to 8 hours of care under direct physician supervision. Detail Level: Zone Protocol For Photochemotherapy For Severe Photoresponsive Dermatoses: Petrolatum And Broad Band Uvb: The patient received Photochemotherapyfor severe photoresponsive dermatoses: Petrolatum and Broad Band UVB requiring at least 4 to 8 hours of care under direct physician supervision. Protocol For Photochemotherapy: Triamcinolone Ointment And Nbuvb: The patient received Photochemotherapy: Triamcinolone and NBUVB (triamcinolone ointment applied to all lesions prior to phototherapy). Consent: Written consent obtained.  The risks were reviewed with the patient including but not limited to: burn, pigmentary changes, pain, blistering, scabbing, redness, increased risk of skin cancers, and the remote possibility of scarring. Protocol For Photochemotherapy For Severe Photoresponsive Dermatoses: Tar And Nbuvb (Goeckerman Treatment): The patient received Photochemotherapy for severe photoresponsive dermatoses: Tar and NBUVB (Goeckerman treatment) requiring at least 4 to 8 hours of care under direct physician supervision. Protocol For Photochemotherapy: Baby Oil And Nbuvb: The patient received Photochemotherapy: Baby Oil and NBUVB (baby oil applied to all lesions prior to phototherapy). Protocol For Puva: The patient received PUVA. Protocol For Photochemotherapy: Petrolatum And Broad Band Uvb: The patient received Photochemotherapy: Petrolatum and Broad Band UVB. Protocol For Photochemotherapy: Petrolatum And Nbuvb: The patient received Photochemotherapy: Petrolatum and NBUVB (petrolatum applied to all lesions prior to phototherapy). Patient/Caregiver provided printed discharge information.

## 2025-02-19 NOTE — DISCHARGE NOTE NURSING/CASE MANAGEMENT/SOCIAL WORK - PATIENT PORTAL LINK FT
Patient dropped off the following forms: [facility]     Patient requesting to have faxed to [facility] on patient's behalf.  Fax to [phone]  Date Needed: N/A  Where did writer send/place forms? Forms completion     Patient completed release of information for medical records and writer verified completed correctly: Yes    Patient verified it was ok to leave a detailed voicemail: Yes.    Caller was advised turnaround time is 7-14 business days for Forms Completion.  Yes.    You can access the FollowMyHealth Patient Portal offered by [facility] by registering at the following website: [email or web address]. By joining [employer]’s FollowMyHealth portal, you will also be able to view your health information using other applications (apps) compatible with our system.

## 2025-02-21 NOTE — PHYSICAL THERAPY INITIAL EVALUATION ADULT - GAIT DEVIATIONS NOTED, PT EVAL
Patient independently ambulates, patient oxygen dropped to 82% while ambulating. Patient oxygen increased back to 92% upon returning to bed. MD Chey mustafa.   
Provider E-Visit time total (minutes): 0  
flexed posture, difficutly extending trunk. LEs/decreased ebenezer/decreased step length/decreased stride length/decreased weight-shifting ability
